# Patient Record
Sex: MALE | Employment: FULL TIME | RURAL
[De-identification: names, ages, dates, MRNs, and addresses within clinical notes are randomized per-mention and may not be internally consistent; named-entity substitution may affect disease eponyms.]

---

## 2017-02-01 ENCOUNTER — OFFICE VISIT (OUTPATIENT)
Dept: FAMILY MEDICINE CLINIC | Age: 67
End: 2017-02-01

## 2017-02-01 VITALS
DIASTOLIC BLOOD PRESSURE: 81 MMHG | TEMPERATURE: 99.1 F | OXYGEN SATURATION: 100 % | BODY MASS INDEX: 24.44 KG/M2 | HEIGHT: 69 IN | HEART RATE: 69 BPM | SYSTOLIC BLOOD PRESSURE: 134 MMHG | RESPIRATION RATE: 18 BRPM | WEIGHT: 165 LBS

## 2017-02-01 DIAGNOSIS — J40 BRONCHITIS: Primary | ICD-10-CM

## 2017-02-01 RX ORDER — AZITHROMYCIN 250 MG/1
TABLET, FILM COATED ORAL
Qty: 6 TAB | Refills: 0 | Status: SHIPPED | OUTPATIENT
Start: 2017-02-01 | End: 2017-10-13 | Stop reason: ALTCHOICE

## 2017-02-01 NOTE — PROGRESS NOTES
Subjective:  Priya Jones presents with cough congestion he has had for the last several days cough occasionally productive dark colored sputum. He denies any fever chills. Does have paroxysmal coughing episodes. Denies any shortness of breath respiratory distress no history of asthma or COPD. Had been a heavy smoker up until 1 year ago when he stopped  Appetite is fair fluid intakes good. .  Some mild dyspnea on exertion no chest pain or associated cardiac symptoms  History of positive hepatitis C antibody, reflex hepatitis virus negative, have encouraged follow-up with hepatologist he continues to decline states she has been positive since 1970s has been evaluated previously. Continue to strongly advocate he follow-up with specialist he is not interested at this time  Continues to follow-up with urology for prostate CA regularly    Problem list reviewed as part of this encounter. Past Medical History   Diagnosis Date    Cancer [facility])     Malignant neoplasm of prostate ([facility] [address]      RT      Medication list reviewed and updated. Review of Systems -as per the above otherwise negative  Objective:  Visit Vitals    /81 (BP 1 Location: Left arm, BP Patient Position: Sitting)    Pulse 69    Temp 99.1 °F (37.3 °C) (Temporal)    Resp 18    Ht 5' 8.5\" (1.74 m)    Wt 165 lb (74.8 kg)    SpO2 100%    BMI 24.72 kg/m2     Alert and oriented. No acute distress. HEENT Ears TMs are normal.  Canals are clear. Eyes pupils equal, round, react to light and accommodation. Extraocular movements intact. Nose patent. Mouth and throat is clear. Neck supple full range of motion no thyromegaly. No carotid bruits. No significant lymphadenopathy  Lungs clear to auscultation without wheezes, rales, rhonchi scattered. No respiratory distress accessory muscle use or tachypnea  Heart  RRR,  S1 & S2 are normal intensity. No murmur; no gallop  Abdomen bowel sounds active.   No tenderness, guarding, rebound, masses, organomegaly. Back no CVA tenderness. Extremities without clubbing, cyanosis, or edema  Neuro HMF intact. Assessment:  Encounter Diagnosis   Name Primary?  Bronchitis Yes           Plan:  See orders. Orders Placed This Encounter    azithromycin (ZITHROMAX) 250 mg tablet     Sig: Take 2 tablets today, then take 1 tablet daily     Dispense:  6 Tab     Refill:  0    fluids rest acetaminophen ibuprofen as needed Robitussin-DM medications as ordered  Follow up if symptoms worsen, change, fail to improve or any new symptoms develop. There are no Patient Instructions on file for this visit.     (Please note that portions of this note were completed with a voice recognition program. Efforts were made to edit the dictations but occasionally words are mis-transcribed.)

## 2017-02-01 NOTE — MR AVS SNAPSHOT
Visit Information Date & Time Provider Department Dept. Phone Encounter #  
 2/1/2017  8:45 AM Ethan Mejia [phone] [id number] Your Appointments 2/1/2017  8:45 AM  
ESTABLISHED PATIENT with MD Ethan Mejia Hillary ([address]) Appt Note: cough/cold [address] [address]  
[address] [facility] [address] [address] Upcoming Health Maintenance Date Due Pneumococcal 19-64 Highest Risk (1 of 3 - PCV13) 9/26/1971 FOBT Q 1 YEAR AGE 50-75 9/26/2002 INFLUENZA AGE 9 TO ADULT 8/1/2016 DTaP/Tdap/Td series (2 - Td) 3/9/2026 Allergies as of 2/1/2017  Review Complete On: 2/1/2017 By: Susan Hines MD  
  
 Severity Noted Reaction Type Reactions Codeine  09/10/2013    Itching Current Immunizations  Never Reviewed Name Date Tdap 3/9/2016 Not reviewed this visit You Were Diagnosed With   
  
 Codes Comments Bronchitis    -  Primary ICD-10-CM: L35 ICD-9-CM: 807 Vitals BP Pulse Temp Resp Height(growth percentile) 134/81 (BP 1 Location: Left arm, BP Patient Position: Sitting) 69 99.1 °F (37.3 °C) (Temporal) 18 5' 8.5\" (1.74 m) Weight(growth percentile) SpO2 BMI Smoking Status 165 lb (74.8 kg) 100% 24.72 kg/m2 Former Smoker BMI and BSA Data Body Mass Index Body Surface Area 24.72 kg/m 2 1.9 m 2 Preferred Pharmacy Pharmacy Name Phone [address], [address] [place] [address] Sheri Landaverde [facility] [phone] Your Updated Medication List  
  
   
This list is accurate as of: 2/1/17  8:29 AM.  Always use your most recent med list.  
  
  
  
  
 azithromycin 250 mg tablet Commonly known as:  Damian Escobar Take 2 tablets today, then take 1 tablet daily CIALIS 5 mg tablet Generic drug:  tadalafil Take 5 mg by mouth as needed. Prescriptions Sent to Pharmacy Refills  
 azithromycin (ZITHROMAX) 250 mg tablet 0 Sig: Take 2 tablets today, then take 1 tablet daily Class: Normal  
 Pharmacy: [address], [address]  #: [phone] Introducing [address]! Paddy Saba introduces [facility] patient portal. Now you can access parts of your medical record, email your doctor's office, and request medication refills online. 1. In your internet browser, go to [email or web address] 2. Click on the First Time User? Click Here link in the Sign In box. You will see the New Member Sign Up page. 3. Enter your [facility] Access Code exactly as it appears below. You will not need to use this code after youve completed the sign-up process. If you do not sign up before the expiration date, you must request a new code. · [facility] Access Code: [email or web address] Expires: 3/13/2017  7:45 AM 
 
4. Enter the last four digits of your Social Security Number (xxxx) and Date of Birth (mm/dd/yyyy) as indicated and click Submit. You will be taken to the next sign-up page. 5. Create a [facility] ID. This will be your [facility] login ID and cannot be changed, so think of one that is secure and easy to remember. 6. Create a [facility] password. You can change your password at any time. 7. Enter your Password Reset Question and Answer. This can be used at a later time if you forget your password. 8. Enter your e-mail address. You will receive e-mail notification when new information is available in [address] 9. Click Sign Up. You can now view and download portions of your medical record. 10. Click the Download Summary menu link to download a portable copy of your medical information. If you have questions, please visit the Frequently Asked Questions section of the [facility] website. Remember, [facility] is NOT to be used for urgent needs. For medical emergencies, dial 911. Now available from your iPhone and Android! Please provide this summary of care documentation to your next provider. Your primary care clinician is listed as Rosario Finnegan. If you have any questions after today's visit, please call [phone].

## 2017-10-13 ENCOUNTER — OFFICE VISIT (OUTPATIENT)
Dept: FAMILY MEDICINE CLINIC | Age: 67
End: 2017-10-13

## 2017-10-13 VITALS
OXYGEN SATURATION: 99 % | SYSTOLIC BLOOD PRESSURE: 128 MMHG | BODY MASS INDEX: 24.65 KG/M2 | WEIGHT: 166.4 LBS | DIASTOLIC BLOOD PRESSURE: 72 MMHG | HEIGHT: 69 IN | RESPIRATION RATE: 22 BRPM | HEART RATE: 65 BPM | TEMPERATURE: 98 F

## 2017-10-13 DIAGNOSIS — J06.9 UPPER RESPIRATORY TRACT INFECTION, UNSPECIFIED TYPE: Primary | ICD-10-CM

## 2017-10-13 RX ORDER — AZITHROMYCIN 250 MG/1
TABLET, FILM COATED ORAL
Qty: 6 TAB | Refills: 0 | Status: SHIPPED | OUTPATIENT
Start: 2017-10-13 | End: 2017-10-18

## 2017-10-13 NOTE — MR AVS SNAPSHOT
Visit Information Date & Time Provider Department Dept. Phone Encounter #  
 10/13/2017 11:00 AM Juwan Gutierrez, [address] [phone] [id number] Upcoming Health Maintenance Date Due FOBT Q 1 YEAR AGE 50-75 9/26/2002 GLAUCOMA SCREENING Q2Y 9/26/2017 Pneumococcal 65+ High/Highest Risk (2 of 2 - PPSV23) 12/8/2017 MEDICARE YEARLY EXAM 10/14/2018 DTaP/Tdap/Td series (2 - Td) 3/9/2026 Allergies as of 10/13/2017  Review Complete On: 10/13/2017 By: LUCY Card Severity Noted Reaction Type Reactions Codeine  09/10/2013    Itching Current Immunizations  Never Reviewed Name Date Tdap 3/9/2016 Not reviewed this visit You Were Diagnosed With   
  
 Codes Comments Upper respiratory tract infection, unspecified type    -  Primary ICD-10-CM: J06.9 ICD-9-CM: 465.9 Vitals BP Pulse Temp Resp Height(growth percentile) Weight(growth percentile) 128/72 (BP 1 Location: Left arm, BP Patient Position: Sitting) 65 98 °F (36.7 °C) (Temporal) 22 5' 8.5\" (1.74 m) 166 lb 6.4 oz (75.5 kg) SpO2 BMI Smoking Status 99% 24.93 kg/m2 Former Smoker Vitals History BMI and BSA Data Body Mass Index Body Surface Area 24.93 kg/m 2 1.91 m 2 Preferred Pharmacy Pharmacy Name Phone [address], [facility] [place] [phone] Your Updated Medication List  
  
   
This list is accurate as of: 10/13/17 11:30 AM.  Always use your most recent med list.  
  
  
  
  
 azithromycin 250 mg tablet Commonly known as:  Louie Reyes Take 2 tablets today, then take 1 tablet daily CIALIS 5 mg tablet Generic drug:  tadalafil Take 5 mg by mouth as needed. Prescriptions Sent to Pharmacy Refills  
 azithromycin (ZITHROMAX) 250 mg tablet 0 Sig: Take 2 tablets today, then take 1 tablet daily  Class: Normal  
 Pharmacy: [address] [facility], [address]  #: [phone] Introducing [facility]! [employer] introduces [employer] patient portal. Now you can access parts of your medical record, email your doctor's office, and request medication refills online. 1. In your internet browser, go to [email or web address] 2. Click on the First Time User? Click Here link in the Sign In box. You will see the New Member Sign Up page. 3. Enter your [employer] Access Code exactly as it appears below. You will not need to use this code after youve completed the sign-up process. If you do not sign up before the expiration date, you must request a new code. · [employer] Access Code: [email or web address] Expires: 1/11/2018 11:30 AM 
 
4. Enter the last four digits of your Social Security Number (xxxx) and Date of Birth (mm/dd/yyyy) as indicated and click Submit. You will be taken to the next sign-up page. 5. Create a [employer] ID. This will be your [employer] login ID and cannot be changed, so think of one that is secure and easy to remember. 6. Create a [employer] password. You can change your password at any time. 7. Enter your Password Reset Question and Answer. This can be used at a later time if you forget your password. 8. Enter your e-mail address. You will receive e-mail notification when new information is available in [address] 9. Click Sign Up. You can now view and download portions of your medical record. 10. Click the Download Summary menu link to download a portable copy of your medical information. If you have questions, please visit the Frequently Asked Questions section of the [employer] website. Remember, [employer] is NOT to be used for urgent needs. For medical emergencies, dial 911. Now available from your iPhone and Android! Please provide this summary of care documentation to your next provider. Your primary care clinician is listed as Chapito Martinez. If you have any questions after today's visit, please call [phone].

## 2017-10-13 NOTE — PROGRESS NOTES
[address] [address] [facility] [place]   [phone]     10/13/2017  Chief Complaint   Patient presents with    Cold Symptoms     cough       HPI   is a 74 y.o. male presents today for acute care visit. Head congestion, drainage with cough over the past week. States he gets it every year at this time       Review of Systems   Constitutional: Negative for chills and fever. HENT: Positive for congestion and sore throat. Respiratory: Positive for cough. Cardiovascular: Negative. Past Medical History:   Diagnosis Date    Cancer [facility])     Malignant neoplasm of prostate (HCC)     RT       Allergies   Allergen Reactions    Codeine Itching       Current Outpatient Prescriptions   Medication Sig    azithromycin (ZITHROMAX) 250 mg tablet Take 2 tablets today, then take 1 tablet daily    tadalafil (CIALIS) 5 mg tablet Take 5 mg by mouth as needed. No current facility-administered medications for this visit. Visit Vitals    /72 (BP 1 Location: Left arm, BP Patient Position: Sitting)    Pulse 65    Temp 98 °F (36.7 °C) (Temporal)    Resp 22    Ht 5' 8.5\" (1.74 m)    Wt 166 lb 6.4 oz (75.5 kg)    SpO2 99%    BMI 24.93 kg/m2     Physical Exam   HENT:   Nose: Sinus tenderness present. Neck: Neck supple. Cardiovascular: Normal heart sounds. Pulmonary/Chest: Effort normal and breath sounds normal.   Lymphadenopathy:     He has no cervical adenopathy. Vitals reviewed. ICD-10-CM ICD-9-CM    1. Upper respiratory tract infection, unspecified type J06.9 465.9 azithromycin (ZITHROMAX) 250 mg tablet             Follow-up Disposition:  Return if symptoms worsen or fail to improve.       Micaela López PA-C, P

## 2018-06-25 ENCOUNTER — OFFICE VISIT (OUTPATIENT)
Dept: FAMILY MEDICINE CLINIC | Age: 68
End: 2018-06-25

## 2018-06-25 VITALS
RESPIRATION RATE: 24 BRPM | DIASTOLIC BLOOD PRESSURE: 70 MMHG | OXYGEN SATURATION: 99 % | HEIGHT: 68 IN | HEART RATE: 76 BPM | TEMPERATURE: 97.3 F | BODY MASS INDEX: 24.31 KG/M2 | SYSTOLIC BLOOD PRESSURE: 136 MMHG | WEIGHT: 160.4 LBS

## 2018-06-25 DIAGNOSIS — B19.20 HEPATITIS C VIRUS INFECTION WITHOUT HEPATIC COMA, UNSPECIFIED CHRONICITY: ICD-10-CM

## 2018-06-25 DIAGNOSIS — F17.290 PIPE SMOKER: ICD-10-CM

## 2018-06-25 DIAGNOSIS — Z00.00 WELL ADULT EXAM: Primary | ICD-10-CM

## 2018-06-25 DIAGNOSIS — Z12.11 COLON CANCER SCREENING: ICD-10-CM

## 2018-06-25 DIAGNOSIS — Z11.59 ENCOUNTER FOR HEPATITIS C SCREENING TEST FOR LOW RISK PATIENT: ICD-10-CM

## 2018-06-25 DIAGNOSIS — C61 PROSTATE CANCER (HCC): ICD-10-CM

## 2018-06-25 NOTE — ACP (ADVANCE CARE PLANNING)
Discussed importance of advanced medical directives with patient. Patient is capable of making decisions.   [facility] NP-C

## 2018-06-25 NOTE — PATIENT INSTRUCTIONS
Body Mass Index: Care Instructions  Your Care Instructions    Body mass index (BMI) can help you see if your weight is raising your risk for health problems. It uses a formula to compare how much you weigh with how tall you are. · A BMI lower than 18.5 is considered underweight. · A BMI between 18.5 and 24.9 is considered healthy. · A BMI between 25 and 29.9 is considered overweight. A BMI of 30 or higher is considered obese. If your BMI is in the normal range, it means that you have a lower risk for weight-related health problems. If your BMI is in the overweight or obese range, you may be at increased risk for weight-related health problems, such as high blood pressure, heart disease, stroke, arthritis or joint pain, and diabetes. If your BMI is in the underweight range, you may be at increased risk for health problems such as fatigue, lower protection (immunity) against illness, muscle loss, bone loss, hair loss, and hormone problems. BMI is just one measure of your risk for weight-related health problems. You may be at higher risk for health problems if you are not active, you eat an unhealthy diet, or you drink too much alcohol or use tobacco products. Follow-up care is a key part of your treatment and safety. Be sure to make and go to all appointments, and call your doctor if you are having problems. It's also a good idea to know your test results and keep a list of the medicines you take. How can you care for yourself at home? · Practice healthy eating habits. This includes eating plenty of fruits, vegetables, whole grains, lean protein, and low-fat dairy. · If your doctor recommends it, get more exercise. Walking is a good choice. Bit by bit, increase the amount you walk every day. Try for at least 30 minutes on most days of the week. · Do not smoke. Smoking can increase your risk for health problems. If you need help quitting, talk to your doctor about stop-smoking programs and medicines. These can increase your chances of quitting for good. · Limit alcohol to 2 drinks a day for men and 1 drink a day for women. Too much alcohol can cause health problems. If you have a BMI higher than 25  · Your doctor may do other tests to check your risk for weight-related health problems. This may include measuring the distance around your waist. A waist measurement of more than 40 inches in men or 35 inches in women can increase the risk of weight-related health problems. · Talk with your doctor about steps you can take to stay healthy or improve your health. You may need to make lifestyle changes to lose weight and stay healthy, such as changing your diet and getting regular exercise. If you have a BMI lower than 18.5  · Your doctor may do other tests to check your risk for health problems. · Talk with your doctor about steps you can take to stay healthy or improve your health. You may need to make lifestyle changes to gain or maintain weight and stay healthy, such as getting more healthy foods in your diet and doing exercises to build muscle. Where can you learn more? Go to http://tacho-roma.info/. Enter S176 in the search box to learn more about \"Body Mass Index: Care Instructions. \"  Current as of: October 13, 2016  Content Version: 11.4  © 9970-0125 Healthwise, [employer]. Care instructions adapted under license by [employer] (which disclaims liability or warranty for this information). If you have questions about a medical condition or this instruction, always ask your healthcare professional. [address] [address] any warranty or liability for your use of this information. Heart-Healthy Diet: Care Instructions  Your Care Instructions    A heart-healthy diet has lots of vegetables, fruits, nuts, beans, and whole grains, and is low in salt. It limits foods that are high in saturated fat, such as meats, cheeses, and fried foods.  It may be hard to change your diet, but even small changes can lower your risk of heart attack and heart disease. Follow-up care is a key part of your treatment and safety. Be sure to make and go to all appointments, and call your doctor if you are having problems. It's also a good idea to know your test results and keep a list of the medicines you take. How can you care for yourself at home? Watch your portions  · Learn what a serving is. A \"serving\" and a \"portion\" are not always the same thing. Make sure that you are not eating larger portions than are recommended. For example, a serving of pasta is ½ cup. A serving size of meat is 2 to 3 ounces. A 3-ounce serving is about the size of a deck of cards. Measure serving sizes until you are good at [place]" them. Keep in mind that restaurants often serve portions that are 2 or 3 times the size of one serving. · To keep your energy level up and keep you from feeling hungry, eat often but in smaller portions. · Eat only the number of calories you need to stay at a healthy weight. If you need to lose weight, eat fewer calories than your body burns (through exercise and other physical activity). Eat more fruits and vegetables  · Eat a variety of fruit and vegetables every day. Dark green, deep orange, red, or yellow fruits and vegetables are especially good for you. Examples include spinach, carrots, peaches, and berries. · Keep carrots, celery, and other veggies handy for snacks. Buy fruit that is in season and store it where you can see it so that you will be tempted to eat it. · Cook dishes that have a lot of veggies in them, such as stir-fries and soups. Limit saturated and trans fat  · Read food labels, and try to avoid saturated and trans fats. They increase your risk of heart disease. Trans fat is found in many processed foods such as cookies and crackers. · Use olive or canola oil when you cook.  Try cholesterol-lowering spreads, such as Benecol or Take Control. · Bake, broil, grill, or steam foods instead of frying them. · Choose lean meats instead of high-fat meats such as hot dogs and sausages. Cut off all visible fat when you prepare meat. · Eat fish, skinless poultry, and meat alternatives such as soy products instead of high-fat meats. Soy products, such as tofu, may be especially good for your heart. · Choose low-fat or fat-free milk and dairy products. Eat fish  · Eat at least two servings of fish a week. Certain fish, such as salmon and tuna, contain omega-3 fatty acids, which may help reduce your risk of heart attack. Eat foods high in fiber  · Eat a variety of grain products every day. Include whole-grain foods that have lots of fiber and nutrients. Examples of whole-grain foods include oats, whole wheat bread, and brown rice. · Buy whole-grain breads and cereals, instead of white bread or pastries. Limit salt and sodium  · Limit how much salt and sodium you eat to help lower your blood pressure. · Taste food before you salt it. Add only a little salt when you think you need it. With time, your taste buds will adjust to less salt. · Eat fewer snack items, fast foods, and other high-salt, processed foods. Check food labels for the amount of sodium in packaged foods. · Choose low-sodium versions of canned goods (such as soups, vegetables, and beans). Limit sugar  · Limit drinks and foods with added sugar. These include candy, desserts, and soda pop. Limit alcohol  · Limit alcohol to no more than 2 drinks a day for men and 1 drink a day for women. Too much alcohol can cause health problems. When should you call for help? Watch closely for changes in your health, and be sure to contact your doctor if:  ? · You would like help planning heart-healthy meals. Where can you learn more? Go to http://tacho-roma.info/. Enter V137 in the search box to learn more about \"Heart-Healthy Diet: Care Instructions. \"  Current as of: September 21, 2016  Content Version: 11.4  © 3416-5606 [employer]. Care instructions adapted under license by [employer] (which disclaims liability or warranty for this information). If you have questions about a medical condition or this instruction, always ask your healthcare professional. [address] any warranty or liability for your use of this information. Learning About Benefits From Quitting Smoking  How does quitting smoking make you healthier? If you're thinking about quitting smoking, you may have a few reasons to be smoke-free. Your health may be one of them. · When you quit smoking, you lower your risks for cancer, lung disease, heart attack, stroke, blood vessel disease, and blindness from macular degeneration. · When you're smoke-free, you get sick less often, and you heal faster. You are less likely to get colds, flu, bronchitis, and pneumonia. · As a nonsmoker, you may find that your mood is better and you are less stressed. When and how will you feel healthier? Quitting has real health benefits that start from day 1 of being smoke-free. And the longer you stay smoke-free, the healthier you get and the better you feel. The first hours  · After just 20 minutes, your blood pressure and heart rate go down. That means there's less stress on your heart and blood vessels. · Within 12 hours, the level of carbon monoxide in your blood drops back to normal. That makes room for more oxygen. With more oxygen in your body, you may notice that you have more energy than when you smoked. After 2 weeks  · Your lungs start to work better. · Your risk of heart attack starts to drop. After 1 month  · When your lungs are clear, you cough less and breathe deeper, so it's easier to be active. · Your sense of taste and smell return. That means you can enjoy food more than you have since you started smoking.   Over the years  · After 1 year, your risk of heart disease is half what it would be if you kept smoking. · After 5 years, your risk of stroke starts to shrink. Within a few years after that, it's about the same as if you'd never smoked. · After 10 years, your risk of dying from lung cancer is cut by about half. And your risk for many other types of cancer is lower too. How would quitting help others in your life? When you quit smoking, you improve the health of everyone who now breathes in your smoke. · Their heart, lung, and cancer risks drop, much like yours. · They are sick less. For babies and small children, living smoke-free means they're less likely to have ear infections, pneumonia, and bronchitis. · If you're a woman who is or will be pregnant someday, quitting smoking means a healthier . · Children who are close to you are less likely to become adult smokers. Where can you learn more? Go to http://tacho-roma.info/. Enter [id number] [id number] [id number] 11 in the search box to learn more about \"Learning About Benefits From Quitting Smoking. \"  Current as of: 2017  Content Version: 11.4  © 2077-2290 Healthwise, Incorporated. Care instructions adapted under license by [employer] (which disclaims liability or warranty for this information). If you have questions about a medical condition or this instruction, always ask your healthcare professional. [address] any warranty or liability for your use of this information.

## 2018-06-25 NOTE — PROGRESS NOTES
Subjective:   HPI:   Eder Araujo works for [employer] where he is on his feet all day. His wife is a state employee. She works at [employer]. Pipe Smoker: He states he has quit smoking a pipe and has switched to vapes. He claims he can breath better and take deeper breaths since switching. He also states that he has had no nose bleeds since switching, where he would have frequent nose bleeds prior. He thinks it is from quitting the pipe. Prostate Cancer: He is requesting to have his PSA checked today. He denies any change in his bladder. He sees Dr. Hien Silver yearly for his prostate. Hepatitis C: He wants a lab drawn for Hep C today. He states he continually goes back and forth with the [facility] to approve Hep C testing. Wanda De Leon is a 74 y.o. male who presents today with the following:  Chief Complaint   Patient presents with    Physical       Patient Active Problem List   Diagnosis Code    Prostate cancer ([facility] C61    Hepatitis C B19.20    Pipe smoker F17.290         COMPLIANT WITH MEDICATION:    Denies chest pain, dyspnea, palpitations, headache and blurred vision. Blood pressure normotensive. BMI:reviewed BMI with him. Prostate cancer: follwed by Dr. Marguerite Aiken smoker: quit now does vaping. Feels better not interested quiting. ROS:  Gen: denies fever, chills, fatigue, weight loss, weight gain  HEENT:denies blurry vision, nasal congestion, sore throat  Resp: denies dypsnea, cough, wheezing  CV: denies chest pain radiating to the jaws or arms, palpitations, lower extremity edema  Abd: denies nausea, vomiting, diarrhea, constipation  Neuro: denies numbness/tingling  Endo: denies polyuria, polydipsia, heat/cold intolerance  Heme: no lymphadenopathy    Allergies   Allergen Reactions    Codeine Itching         Current Outpatient Prescriptions:     tadalafil (CIALIS) 5 mg tablet, Take 5 mg by mouth as needed. , Disp: , Rfl:     Past Medical History:   Diagnosis Date    Cancer ([facility]     Malignant neoplasm of prostate ([facility]     RT       Past Surgical History:   Procedure Laterality Date    HX COLONOSCOPY  2010    HX HERNIA REPAIR  2016    rt inquinal    HX UROLOGICAL      prostate bx       History   Smoking Status    Former Smoker    Quit date: 2/1/2016   Smokeless Tobacco    Never Used       Social History     Social History    Marital status:      Spouse name: N/A    Number of children: N/A    Years of education: N/A     Social History Main Topics    Smoking status: Former Smoker     Quit date: 2/1/2016    Smokeless tobacco: Never Used    Alcohol use Yes      Comment: daily    Drug use: No    Sexual activity: Not Asked     Other Topics Concern    None     Social History Narrative       Family History   Problem Relation Age of Onset    Alzheimer Mother     Cancer Father     Heart Disease Father     Hypertension Father          Objective:     Visit Vitals    /70 (BP 1 Location: Left arm, BP Patient Position: Sitting)    Pulse 76    Temp 97.3 °F (36.3 °C) (Temporal)    Resp 24    Ht 5' 8\" (1.727 m)    Wt 160 lb 6.4 oz (72.8 kg)    SpO2 99%    BMI 24.39 kg/m2     Body mass index is 24.39 kg/(m^2). General: Alert and oriented. No acute distress. Well nourished  HEENT :  Ears:TMs are normal. Canals are clear. Eyes: pupils equal, round, react to light and accommodation. Extra ocular movements intact. Nose: patent. Mouth and throat is clear. Neck:supple full range of motion no thyromegaly. Trachea midline, No carotid bruits. No significant lymphadenopathy  Lungs[de-identified] clear to auscultation without wheezes, rales, or rhonchi. Heart :RRR, S1 & S2 are normal intensity. No murmur; no gallop  Abdomen: bowel sounds active. No tenderness, guarding, rebound, masses, hepatic or spleen enlargement  Back: no CVA tenderness. Extremities: without clubbing, cyanosis, or edema  Pulses: radial and femoral pulses are normal  Neuro: HMF intact.  Cranial nerves II through XII grossly normal.  Motor: is 5 over 5 and symmetrical.   Deep tendon reflexes: +2 equal    Results for orders placed or performed in visit on 12/13/16   PSA DIAGNOSTIC (PROSTATIC SPECIFIC AG)   Result Value Ref Range    Prostate Specific Ag 0.4 0.0 - 4.0 ng/mL       No results found for this visit on 06/25/18. Assessment/ Plan:     1. Colon cancer screening  Had a colonoscopy 8 years ago and is on a 10 year follow-up. Will have another screening colonoscopy in 2020.  - OCCULT BLOOD, IMMUNOASSAY (FIT); Future    2. BMI 24.0-24.9, adult  Discussed the patient's BMI with him. The BMI follow up plan is as follows: He says he is on his feet all day walking for work and that is his exercise. dietary management education, guidance, and counseling  encourage exercise  monitor weight  prescribed dietary intake    An After Visit Summary was printed and given to the patient. - OCCULT BLOOD, IMMUNOASSAY (FIT); Future  - CBC WITH AUTOMATED DIFF  - METABOLIC PANEL, COMPREHENSIVE  - LIPID PANEL  - COLLECTION VENOUS BLOOD,VENIPUNCTURE    3. Prostate cancer ([facility]  Followed by Dr. Jenifer Yeager yearly  PSA 0.4 last check per patient  4. Well adult exam    - OCCULT BLOOD, IMMUNOASSAY (FIT); Future  - CBC WITH AUTOMATED DIFF  - METABOLIC PANEL, COMPREHENSIVE  - LIPID PANEL  - COLLECTION VENOUS BLOOD,VENIPUNCTURE    5. Hepatitis C virus infection without hepatic coma, unspecified chronicity    -Hep C screening    6. Pipe smoker  Smoking cessation counseling provided. He has quit smoking a pipe and vapes. Orders Placed This Encounter    COLLECTION VENOUS BLOOD,VENIPUNCTURE    OCCULT BLOOD, IMMUNOASSAY (FIT)     Standing Status:   Future     Standing Expiration Date:   12/25/2018    CBC WITH AUTOMATED DIFF    METABOLIC PANEL, COMPREHENSIVE    LIPID PANEL         Verbal and written instructions (see AVS) provided.  Patient expresses understanding of diagnosis and treatment plan.     Health Maintenance Due   Topic Date Due    FOBT Q 1 YEAR AGE 50-75  09/26/2002    GLAUCOMA SCREENING Q2Y  09/26/2017    Pneumococcal 65+ High/Highest Risk (2 of 2 - PPSV23) 12/08/2017         Follow-up Disposition:  Return in about 1 year (around 6/25/2019). Or sooner as needed.       Adolph Moise, FNP-C

## 2018-06-25 NOTE — MR AVS SNAPSHOT
[address] [place] [address] [facility]  
[phone] Patient: Aniya Schroeder 
MRN: [id number]:[id number] Visit Information Date & Time Provider Department Dept. Phone Encounter #  
 6/25/2018  7:30 AM Leatha Muñiz, BIA [facility]dard [address] [facility] [phone] [id number] Upcoming Health Maintenance Date Due FOBT Q 1 YEAR AGE 50-75 9/26/2002 GLAUCOMA SCREENING Q2Y 9/26/2017 Pneumococcal 65+ High/Highest Risk (2 of 2 - PPSV23) 12/8/2017 Influenza Age 5 to Adult 8/1/2018 DTaP/Tdap/Td series (2 - Td) 3/9/2026 Allergies as of 6/25/2018  Review Complete On: 6/25/2018 By: Leatha Muñiz, BIA Severity Noted Reaction Type Reactions Codeine  09/10/2013    Itching Current Immunizations  Never Reviewed Name Date Tdap 3/9/2016 Not reviewed this visit You Were Diagnosed With   
  
 Codes Comments Well adult exam    -  Primary ICD-10-CM: Z00.00 ICD-9-CM: V70.0 Colon cancer screening     ICD-10-CM: Z12.11 ICD-9-CM: V76.51   
 BMI 24.0-24.9, adult     ICD-10-CM: Z68.24 ICD-9-CM: V85.1 Prostate cancer [facility])     ICD-10-CM: V09 ICD-9-CM: 766 Hepatitis C virus infection without hepatic coma, unspecified chronicity     ICD-10-CM: B19.20 ICD-9-CM: 070.70 Pipe smoker     ICD-10-CM: F17.290 ICD-9-CM: 305.1 Encounter for hepatitis C screening test for low risk patient     ICD-10-CM: Z11.59 
ICD-9-CM: V73.89 Vitals BP Pulse Temp Resp Height(growth percentile) 136/70 (BP 1 Location: Left arm, BP Patient Position: Sitting) 76 97.3 °F (36.3 °C) (Temporal) 24 5' 8\" (1.727 m) Weight(growth percentile) SpO2 BMI Smoking Status 160 lb 6.4 oz (72.8 kg) 99% 24.39 kg/m2 Former Smoker BMI and BSA Data Body Mass Index Body Surface Area  
 24.39 kg/m 2 1.87 m 2 Preferred Pharmacy Pharmacy Name Phone [address] [facility], [address] [phone] Your Updated Medication List  
  
   
This list is accurate as of 6/25/18  8:26 AM.  Always use your most recent med list.  
  
  
  
  
 CIALIS 5 mg tablet Generic drug:  tadalafil Take 5 mg by mouth as needed. We Performed the Following CBC WITH AUTOMATED DIFF [31553 CPT(R)] COLLECTION VENOUS BLOOD,VENIPUNCTURE [id number] CPT(R)] HEPATITIS C AB [12881 CPT(R)] LIPID PANEL [19314 CPT(R)] METABOLIC PANEL, COMPREHENSIVE [41938 CPT(R)] PSA, DIAGNOSTIC (PROSTATE SPECIFIC AG) [id number] CPT(R)] To-Do List   
 07/25/2018 Lab:  OCCULT BLOOD, IMMUNOASSAY (FIT) Patient Instructions Body Mass Index: Care Instructions Your Care Instructions Body mass index (BMI) can help you see if your weight is raising your risk for health problems. It uses a formula to compare how much you weigh with how tall you are. · A BMI lower than 18.5 is considered underweight. · A BMI between 18.5 and 24.9 is considered healthy. · A BMI between 25 and 29.9 is considered overweight. A BMI of 30 or higher is considered obese. If your BMI is in the normal range, it means that you have a lower risk for weight-related health problems. If your BMI is in the overweight or obese range, you may be at increased risk for weight-related health problems, such as high blood pressure, heart disease, stroke, arthritis or joint pain, and diabetes. If your BMI is in the underweight range, you may be at increased risk for health problems such as fatigue, lower protection (immunity) against illness, muscle loss, bone loss, hair loss, and hormone problems. BMI is just one measure of your risk for weight-related health problems. You may be at higher risk for health problems if you are not active, you eat an unhealthy diet, or you drink too much alcohol or use tobacco products. Follow-up care is a key part of your treatment and safety. Be sure to make and go to all appointments, and call your doctor if you are having problems. It's also a good idea to know your test results and keep a list of the medicines you take. How can you care for yourself at home? · Practice healthy eating habits. This includes eating plenty of fruits, vegetables, whole grains, lean protein, and low-fat dairy. · If your doctor recommends it, get more exercise. Walking is a good choice. Bit by bit, increase the amount you walk every day. Try for at least 30 minutes on most days of the week. · Do not smoke. Smoking can increase your risk for health problems. If you need help quitting, talk to your doctor about stop-smoking programs and medicines. These can increase your chances of quitting for good. · Limit alcohol to 2 drinks a day for men and 1 drink a day for women. Too much alcohol can cause health problems. If you have a BMI higher than 25 · Your doctor may do other tests to check your risk for weight-related health problems. This may include measuring the distance around your waist. A waist measurement of more than 40 inches in men or 35 inches in women can increase the risk of weight-related health problems. · Talk with your doctor about steps you can take to stay healthy or improve your health. You may need to make lifestyle changes to lose weight and stay healthy, such as changing your diet and getting regular exercise. If you have a BMI lower than 18.5 · Your doctor may do other tests to check your risk for health problems. · Talk with your doctor about steps you can take to stay healthy or improve your health. You may need to make lifestyle changes to gain or maintain weight and stay healthy, such as getting more healthy foods in your diet and doing exercises to build muscle. Where can you learn more? Go to http://tacho-roma.info/. Enter S176 in the search box to learn more about \"Body Mass Index: Care Instructions. \" Current as of: October 13, 2016 Content Version: 11.4 © 0487-7184 [employer]. Care instructions adapted under license by [employer] (which disclaims liability or warranty for this information). If you have questions about a medical condition or this instruction, always ask your healthcare professional. [address] any warranty or liability for your use of this information. Heart-Healthy Diet: Care Instructions Your Care Instructions A heart-healthy diet has lots of vegetables, fruits, nuts, beans, and whole grains, and is low in salt. It limits foods that are high in saturated fat, such as meats, cheeses, and fried foods. It may be hard to change your diet, but even small changes can lower your risk of heart attack and heart disease. Follow-up care is a key part of your treatment and safety. Be sure to make and go to all appointments, and call your doctor if you are having problems. It's also a good idea to know your test results and keep a list of the medicines you take. How can you care for yourself at home? Watch your portions · Learn what a serving is. A \"serving\" and a \"portion\" are not always the same thing. Make sure that you are not eating larger portions than are recommended. For example, a serving of pasta is ½ cup. A serving size of meat is 2 to 3 ounces. A 3-ounce serving is about the size of a deck of cards. Measure serving sizes until you are good at [place]" them. Keep in mind that restaurants often serve portions that are 2 or 3 times the size of one serving. · To keep your energy level up and keep you from feeling hungry, eat often but in smaller portions. · Eat only the number of calories you need to stay at a healthy weight. If you need to lose weight, eat fewer calories than your body burns (through exercise and other physical activity). Eat more fruits and vegetables · Eat a variety of fruit and vegetables every day. Dark green, deep orange, red, or yellow fruits and vegetables are especially good for you. Examples include spinach, carrots, peaches, and berries. · Keep carrots, celery, and other veggies handy for snacks. Buy fruit that is in season and store it where you can see it so that you will be tempted to eat it. · Cook dishes that have a lot of veggies in them, such as stir-fries and soups. Limit saturated and trans fat · Read food labels, and try to avoid saturated and trans fats. They increase your risk of heart disease. Trans fat is found in many processed foods such as cookies and crackers. · Use olive or canola oil when you cook. Try cholesterol-lowering spreads, such as Benecol or Take Control. · Bake, broil, grill, or steam foods instead of frying them. · Choose lean meats instead of high-fat meats such as hot dogs and sausages. Cut off all visible fat when you prepare meat. · Eat fish, skinless poultry, and meat alternatives such as soy products instead of high-fat meats. Soy products, such as tofu, may be especially good for your heart. · Choose low-fat or fat-free milk and dairy products. Eat fish · Eat at least two servings of fish a week. Certain fish, such as salmon and tuna, contain omega-3 fatty acids, which may help reduce your risk of heart attack. Eat foods high in fiber · Eat a variety of grain products every day. Include whole-grain foods that have lots of fiber and nutrients. Examples of whole-grain foods include oats, whole wheat bread, and brown rice. · Buy whole-grain breads and cereals, instead of white bread or pastries. Limit salt and sodium · Limit how much salt and sodium you eat to help lower your blood pressure. · Taste food before you salt it. Add only a little salt when you think you need it. With time, your taste buds will adjust to less salt. · Eat fewer snack items, fast foods, and other high-salt, processed foods. Check food labels for the amount of sodium in packaged foods. · Choose low-sodium versions of canned goods (such as soups, vegetables, and beans). Limit sugar · Limit drinks and foods with added sugar. These include candy, desserts, and soda pop. Limit alcohol · Limit alcohol to no more than 2 drinks a day for men and 1 drink a day for women. Too much alcohol can cause health problems. When should you call for help? Watch closely for changes in your health, and be sure to contact your doctor if: 
? · You would like help planning heart-healthy meals. Where can you learn more? Go to http://tacho-roma.info/. Enter V137 in the search box to learn more about \"Heart-Healthy Diet: Care Instructions. \" Current as of: September 21, 2016 Content Version: 11.4 © 6824-1979 [employer]. Care instructions adapted under license by [employer] (which disclaims liability or warranty for this information). If you have questions about a medical condition or this instruction, always ask your healthcare professional. [place] [address] any warranty or liability for your use of this information. Learning About Benefits From Quitting Smoking How does quitting smoking make you healthier? If you're thinking about quitting smoking, you may have a few reasons to be smoke-free. Your health may be one of them. · When you quit smoking, you lower your risks for cancer, lung disease, heart attack, stroke, blood vessel disease, and blindness from macular degeneration. · When you're smoke-free, you get sick less often, and you heal faster. You are less likely to get colds, flu, bronchitis, and pneumonia. · As a nonsmoker, you may find that your mood is better and you are less stressed. When and how will you feel healthier?  
Quitting has real health benefits that start from day 1 of being smoke-free. And the longer you stay smoke-free, the healthier you get and the better you feel. The first hours · After just 20 minutes, your blood pressure and heart rate go down. That means there's less stress on your heart and blood vessels. · Within 12 hours, the level of carbon monoxide in your blood drops back to normal. That makes room for more oxygen. With more oxygen in your body, you may notice that you have more energy than when you smoked. After 2 weeks · Your lungs start to work better. · Your risk of heart attack starts to drop. After 1 month · When your lungs are clear, you cough less and breathe deeper, so it's easier to be active. · Your sense of taste and smell return. That means you can enjoy food more than you have since you started smoking. Over the years · After 1 year, your risk of heart disease is half what it would be if you kept smoking. · After 5 years, your risk of stroke starts to shrink. Within a few years after that, it's about the same as if you'd never smoked. · After 10 years, your risk of dying from lung cancer is cut by about half. And your risk for many other types of cancer is lower too. How would quitting help others in your life? When you quit smoking, you improve the health of everyone who now breathes in your smoke. · Their heart, lung, and cancer risks drop, much like yours. · They are sick less. For babies and small children, living smoke-free means they're less likely to have ear infections, pneumonia, and bronchitis. · If you're a woman who is or will be pregnant someday, quitting smoking means a healthier . · Children who are close to you are less likely to become adult smokers. Where can you learn more? Go to http://tacho-roma.info/. Enter [id number] [id number] [id number] 11 in the search box to learn more about \"Learning About Benefits From Quitting Smoking. \" Current as of: 2017 Content Version: 11.4 © 6916-8104 Healthwise, Incorporated. Care instructions adapted under license by [employer] (which disclaims liability or warranty for this information). If you have questions about a medical condition or this instruction, always ask your healthcare professional. [address] any warranty or liability for your use of this information. Introducing [facility]! [facility] introduces Anatexis patient portal. Now you can access parts of your medical record, email your doctor's office, and request medication refills online. 1. In your internet browser, go to [email or web address] 2. Click on the First Time User? Click Here link in the Sign In box. You will see the New Member Sign Up page. 3. Enter your Anatexis Access Code exactly as it appears below. You will not need to use this code after youve completed the sign-up process. If you do not sign up before the expiration date, you must request a new code. · Anatexis Access Code: [email or web address] Expires: 9/23/2018  8:26 AM 
 
4. Enter the last four digits of your Social Security Number (xxxx) and Date of Birth (mm/dd/yyyy) as indicated and click Submit. You will be taken to the next sign-up page. 5. Create a Anatexis ID. This will be your Anatexis login ID and cannot be changed, so think of one that is secure and easy to remember. 6. Create a Anatexis password. You can change your password at any time. 7. Enter your Password Reset Question and Answer. This can be used at a later time if you forget your password. 8. Enter your e-mail address. You will receive e-mail notification when new information is available in [address] 9. Click Sign Up. You can now view and download portions of your medical record. 10. Click the Download Summary menu link to download a portable copy of your medical information.  
 
If you have questions, please visit the Frequently Asked Questions section of the THE [employer]TY [employer]. Remember, Keepyhart is NOT to be used for urgent needs. For medical emergencies, dial 911. Now available from your iPhone and Android! Please provide this summary of care documentation to your next provider. Your primary care clinician is listed as Rennis Marker. If you have any questions after today's visit, please call [phone].

## 2018-06-26 LAB
ALBUMIN SERPL-MCNC: 4.7 G/DL (ref 3.6–4.8)
ALBUMIN/GLOB SERPL: 2.5 {RATIO} (ref 1.2–2.2)
ALP SERPL-CCNC: 48 IU/L (ref 39–117)
ALT SERPL-CCNC: 20 IU/L (ref 0–44)
AST SERPL-CCNC: 18 IU/L (ref 0–40)
BASOPHILS # BLD AUTO: 0 X10E3/UL (ref 0–0.2)
BASOPHILS NFR BLD AUTO: 0 %
BILIRUB SERPL-MCNC: 0.7 MG/DL (ref 0–1.2)
BUN SERPL-MCNC: 11 MG/DL (ref 8–27)
BUN/CREAT SERPL: 13 (ref 10–24)
CALCIUM SERPL-MCNC: 9.4 MG/DL (ref 8.6–10.2)
CHLORIDE SERPL-SCNC: 105 MMOL/L (ref 96–106)
CHOLEST SERPL-MCNC: 167 MG/DL (ref 100–199)
CO2 SERPL-SCNC: 22 MMOL/L (ref 20–29)
CREAT SERPL-MCNC: 0.82 MG/DL (ref 0.76–1.27)
EOSINOPHIL # BLD AUTO: 0.1 X10E3/UL (ref 0–0.4)
EOSINOPHIL NFR BLD AUTO: 1 %
ERYTHROCYTE [DISTWIDTH] IN BLOOD BY AUTOMATED COUNT: 13 % (ref 12.3–15.4)
GLOBULIN SER CALC-MCNC: 1.9 G/DL (ref 1.5–4.5)
GLUCOSE SERPL-MCNC: 97 MG/DL (ref 65–99)
HCT VFR BLD AUTO: 44.2 % (ref 37.5–51)
HCV AB S/CO SERPL IA: 6 S/CO RATIO (ref 0–0.9)
HDLC SERPL-MCNC: 103 MG/DL
HGB BLD-MCNC: 14.7 G/DL (ref 13–17.7)
IMM GRANULOCYTES # BLD: 0 X10E3/UL (ref 0–0.1)
IMM GRANULOCYTES NFR BLD: 0 %
LDLC SERPL CALC-MCNC: 56 MG/DL (ref 0–99)
LYMPHOCYTES # BLD AUTO: 2 X10E3/UL (ref 0.7–3.1)
LYMPHOCYTES NFR BLD AUTO: 30 %
MCH RBC QN AUTO: 30.9 PG (ref 26.6–33)
MCHC RBC AUTO-ENTMCNC: 33.3 G/DL (ref 31.5–35.7)
MCV RBC AUTO: 93 FL (ref 79–97)
MONOCYTES # BLD AUTO: 0.6 X10E3/UL (ref 0.1–0.9)
MONOCYTES NFR BLD AUTO: 9 %
NEUTROPHILS # BLD AUTO: 4.1 X10E3/UL (ref 1.4–7)
NEUTROPHILS NFR BLD AUTO: 60 %
PLATELET # BLD AUTO: 298 X10E3/UL (ref 150–379)
POTASSIUM SERPL-SCNC: 5.2 MMOL/L (ref 3.5–5.2)
PROT SERPL-MCNC: 6.6 G/DL (ref 6–8.5)
PSA SERPL-MCNC: 0.3 NG/ML (ref 0–4)
RBC # BLD AUTO: 4.75 X10E6/UL (ref 4.14–5.8)
SODIUM SERPL-SCNC: 142 MMOL/L (ref 134–144)
TRIGL SERPL-MCNC: 41 MG/DL (ref 0–149)
VLDLC SERPL CALC-MCNC: 8 MG/DL (ref 5–40)
WBC # BLD AUTO: 6.7 X10E3/UL (ref 3.4–10.8)

## 2018-06-29 NOTE — PROGRESS NOTES
Great news!   CBC no anemia  Lipid panel is excellent  PSA WNL   Positive Hep C please send results to specialist

## 2018-07-02 NOTE — PROGRESS NOTES
SW Mr. Grove Diss, he was notified of his lab results. He stated that he is still dealing with the DA regarding his Hep C and would like to hold off right now from sending the results to the specialist.  Message routed to Fransisco Douglass NP and Emily Crews / rebeca.

## 2018-08-30 ENCOUNTER — OFFICE VISIT (OUTPATIENT)
Dept: FAMILY MEDICINE CLINIC | Age: 68
End: 2018-08-30

## 2018-08-30 ENCOUNTER — TELEPHONE (OUTPATIENT)
Dept: FAMILY MEDICINE CLINIC | Age: 68
End: 2018-08-30

## 2018-08-30 VITALS
WEIGHT: 166.2 LBS | HEART RATE: 64 BPM | BODY MASS INDEX: 25.19 KG/M2 | HEIGHT: 68 IN | SYSTOLIC BLOOD PRESSURE: 130 MMHG | RESPIRATION RATE: 16 BRPM | DIASTOLIC BLOOD PRESSURE: 68 MMHG | OXYGEN SATURATION: 97 % | TEMPERATURE: 97.8 F

## 2018-08-30 DIAGNOSIS — J20.9 ACUTE BRONCHITIS, UNSPECIFIED ORGANISM: Primary | ICD-10-CM

## 2018-08-30 RX ORDER — AZITHROMYCIN 250 MG/1
TABLET, FILM COATED ORAL
Qty: 6 TAB | Refills: 0 | Status: SHIPPED | OUTPATIENT
Start: 2018-08-30 | End: 2018-09-04

## 2018-08-30 NOTE — PATIENT INSTRUCTIONS
Bronchitis: Care Instructions Your Care Instructions Bronchitis is inflammation of the bronchial tubes, which carry air to the lungs. The tubes swell and produce mucus, or phlegm. The mucus and inflamed bronchial tubes make you cough. You may have trouble breathing. Most cases of bronchitis are caused by viruses like those that cause colds. Antibiotics usually do not help and they may be harmful. Bronchitis usually develops rapidly and lasts about 2 to 3 weeks in otherwise healthy people. Follow-up care is a key part of your treatment and safety. Be sure to make and go to all appointments, and call your doctor if you are having problems. It's also a good idea to know your test results and keep a list of the medicines you take. How can you care for yourself at home? · Take all medicines exactly as prescribed. Call your doctor if you think you are having a problem with your medicine. · Get some extra rest. 
· Take an over-the-counter pain medicine, such as acetaminophen (Tylenol), ibuprofen (Advil, Motrin), or naproxen (Aleve) to reduce fever and relieve body aches. Read and follow all instructions on the label. · Do not take two or more pain medicines at the same time unless the doctor told you to. Many pain medicines have acetaminophen, which is Tylenol. Too much acetaminophen (Tylenol) can be harmful. · Take an over-the-counter cough medicine that contains dextromethorphan to help quiet a dry, hacking cough so that you can sleep. Avoid cough medicines that have more than one active ingredient. Read and follow all instructions on the label. · Breathe moist air from a humidifier, hot shower, or sink filled with hot water. The heat and moisture will thin mucus so you can cough it out. · Do not smoke. Smoking can make bronchitis worse. If you need help quitting, talk to your doctor about stop-smoking programs and medicines. These can increase your chances of quitting for good. When should you call for help? Call 911 anytime you think you may need emergency care. For example, call if: 
  · You have severe trouble breathing.  
 Call your doctor now or seek immediate medical care if: 
  · You have new or worse trouble breathing.  
  · You cough up dark brown or bloody mucus (sputum).  
  · You have a new or higher fever.  
  · You have a new rash.  
 Watch closely for changes in your health, and be sure to contact your doctor if: 
  · You cough more deeply or more often, especially if you notice more mucus or a change in the color of your mucus.  
  · You are not getting better as expected. Where can you learn more? Go to http://tacho-roma.info/. Enter H333 in the search box to learn more about \"Bronchitis: Care Instructions. \" Current as of: December 6, 2017 Content Version: 11.7 © 3765-7055 [employer], [employer]. Care instructions adapted under license by [employer] (which disclaims liability or warranty for this information). If you have questions about a medical condition or this instruction, always ask your healthcare professional. [address] any warranty or liability for your use of this information.

## 2018-08-30 NOTE — MR AVS SNAPSHOT
[facility] 
 
 
 [address] [facility] [address] 
[phone] Patient: Belinda Morataya 
MRN: [id number] CGR:[id number]/38/9932 Visit Information Date & Time Provider Department Dept. Phone Encounter #  
 8/30/2018  8:10 AM Melanie Alvarez NP [facility] [address] [facility] [phone] [id number] Follow-up Instructions Return if symptoms worsen or fail to improve. Follow-up and Disposition History Your Appointments 8/30/2018  8:10 AM  
ESTABLISHED PATIENT with Melanie Alvarez NP  
Ethan Thornton ([address]) Appt Note: walk-in/OK'D by Rebecca/ellie [address] [address]  
[address] [address] [address] Upcoming Health Maintenance Date Due FOBT Q 1 YEAR AGE 50-75 9/26/2002 Pneumococcal 65+ High/Highest Risk (2 of 2 - PPSV23) 12/8/2017 Influenza Age 5 to Adult 8/1/2018 GLAUCOMA SCREENING Q2Y 5/19/2019 DTaP/Tdap/Td series (2 - Td) 3/9/2026 Allergies as of 8/30/2018  Review Complete On: 8/30/2018 By: Melanie Alvarez NP Severity Noted Reaction Type Reactions Codeine  09/10/2013    Itching Current Immunizations  Never Reviewed Name Date Tdap 3/9/2016 Not reviewed this visit You Were Diagnosed With   
  
 Codes Comments Acute bronchitis, unspecified organism    -  Primary ICD-10-CM: J20.9 ICD-9-CM: 466.0 Vitals BP Pulse Temp Resp Height(growth percentile) 130/68 (BP 1 Location: Left arm, BP Patient Position: Sitting) 64 97.8 °F (36.6 °C) (Temporal) 16 5' 8\" (1.727 m) Weight(growth percentile) SpO2 BMI Smoking Status 166 lb 3.2 oz (75.4 kg) 97% 25.27 kg/m2 Former Smoker BMI and BSA Data Body Mass Index Body Surface Area  
 25.27 kg/m 2 1.9 m 2 Preferred Pharmacy Pharmacy Name Phone [facility], [place] [phone] Your Updated Medication List  
  
   
This list is accurate as of 8/30/18  8:08 AM.  Always use your most recent med list.  
  
  
  
  
 azithromycin 250 mg tablet Commonly known as:  Dakota Burleson Take 2 tablets today, then take 1 tablet daily Prescriptions Sent to Pharmacy Refills  
 azithromycin (ZITHROMAX) 250 mg tablet 0 Sig: Take 2 tablets today, then take 1 tablet daily Class: Normal  
 Pharmacy: [address], [place]  #: [phone] Follow-up Instructions Return if symptoms worsen or fail to improve. Patient Instructions Bronchitis: Care Instructions Your Care Instructions Bronchitis is inflammation of the bronchial tubes, which carry air to the lungs. The tubes swell and produce mucus, or phlegm. The mucus and inflamed bronchial tubes make you cough. You may have trouble breathing. Most cases of bronchitis are caused by viruses like those that cause colds. Antibiotics usually do not help and they may be harmful. Bronchitis usually develops rapidly and lasts about 2 to 3 weeks in otherwise healthy people. Follow-up care is a key part of your treatment and safety. Be sure to make and go to all appointments, and call your doctor if you are having problems. It's also a good idea to know your test results and keep a list of the medicines you take. How can you care for yourself at home? · Take all medicines exactly as prescribed. Call your doctor if you think you are having a problem with your medicine. · Get some extra rest. 
· Take an over-the-counter pain medicine, such as acetaminophen (Tylenol), ibuprofen (Advil, Motrin), or naproxen (Aleve) to reduce fever and relieve body aches. Read and follow all instructions on the label.  
· Do not take two or more pain medicines at the same time unless the doctor told you to. Many pain medicines have acetaminophen, which is Tylenol. Too much acetaminophen (Tylenol) can be harmful. · Take an over-the-counter cough medicine that contains dextromethorphan to help quiet a dry, hacking cough so that you can sleep. Avoid cough medicines that have more than one active ingredient. Read and follow all instructions on the label. · Breathe moist air from a humidifier, hot shower, or sink filled with hot water. The heat and moisture will thin mucus so you can cough it out. · Do not smoke. Smoking can make bronchitis worse. If you need help quitting, talk to your doctor about stop-smoking programs and medicines. These can increase your chances of quitting for good. When should you call for help? Call 911 anytime you think you may need emergency care. For example, call if: 
  · You have severe trouble breathing.  
 Call your doctor now or seek immediate medical care if: 
  · You have new or worse trouble breathing.  
  · You cough up dark brown or bloody mucus (sputum).  
  · You have a new or higher fever.  
  · You have a new rash.  
 Watch closely for changes in your health, and be sure to contact your doctor if: 
  · You cough more deeply or more often, especially if you notice more mucus or a change in the color of your mucus.  
  · You are not getting better as expected. Where can you learn more? Go to http://tacho-roma.info/. Enter H333 in the search box to learn more about \"Bronchitis: Care Instructions. \" Current as of: December 6, 2017 Content Version: 11.7 © 0752-2938 [employer]. Care instructions adapted under license by [employer] (which disclaims liability or warranty for this information). If you have questions about a medical condition or this instruction, always ask your healthcare professional. [address] any warranty or liability for your use of this information. Introducing [facility]! Sumayajamil Rao introduces Sport/Life patient portal. Now you can access parts of your medical record, email your doctor's office, and request medication refills online. 1. In your internet browser, go to [email or web address] 2. Click on the First Time User? Click Here link in the Sign In box. You will see the New Member Sign Up page. 3. Enter your Sport/Life Access Code exactly as it appears below. You will not need to use this code after youve completed the sign-up process. If you do not sign up before the expiration date, you must request a new code. · Sport/Life Access Code: [email or web address] Expires: 9/23/2018  8:26 AM 
 
4. Enter the last four digits of your Social Security Number (xxxx) and Date of Birth (mm/dd/yyyy) as indicated and click Submit. You will be taken to the next sign-up page. 5. Create a Sport/Life ID. This will be your Sport/Life login ID and cannot be changed, so think of one that is secure and easy to remember. 6. Create a Sport/Life password. You can change your password at any time. 7. Enter your Password Reset Question and Answer. This can be used at a later time if you forget your password. 8. Enter your e-mail address. You will receive e-mail notification when new information is available in [address] 9. Click Sign Up. You can now view and download portions of your medical record. 10. Click the Download Summary menu link to download a portable copy of your medical information. If you have questions, please visit the Frequently Asked Questions section of the Sport/Life website. Remember, Sport/Life is NOT to be used for urgent needs. For medical emergencies, dial 911. Now available from your iPhone and Android! Please provide this summary of care documentation to your next provider. Your primary care clinician is listed as Gildardo Watson. If you have any questions after today's visit, please call [phone].

## 2018-08-30 NOTE — PROGRESS NOTES
Chief Complaint Patient presents with  Cold Symptoms  
  with cough for 3 days HPI:   
 
James Diane is a 74 y.o. male with a 4 day history of coughing, congestion. Cough is dry and hacking, non-productive, worse at night. Nonsmoker. + exposure to sick contacts, recent travel to [place]. Allergies Allergen Reactions  Codeine Itching Current Outpatient Prescriptions Medication Sig  
 azithromycin (ZITHROMAX) 250 mg tablet Take 2 tablets today, then take 1 tablet daily No current facility-administered medications for this visit. Past Medical History:  
Diagnosis Date  Cancer ([facility]  Malignant neoplasm of prostate ([facility] RT  
 
 
 
ROS:  Denies fever, chills, + cough, chest pain, SOB,  nausea, vomiting, or diarrhea. Denies wt loss, wt gain, hemoptysis, hematochezia or melena. Physical Examination: 
 
/68 (BP 1 Location: Left arm, BP Patient Position: Sitting)  Pulse 64  Temp 97.8 °F (36.6 °C) (Temporal)   Resp 16  Ht 5' 8\" (1.727 m)  Wt 166 lb 3.2 oz (75.4 kg)  SpO2 97%  BMI 25.27 kg/m2 General: WDWN Male in no acute distress HEENT:  NC/AT, EOMI, TMs pearly gray, OP: clear. Mild erythema, dentures present. Neck:  Supple, no JVD, mass or bruit. No thyromegaly. Chest:  Respirations even and unlabored, CTA throughout without wheezes, rales, rubs or ronchi 
Cardiac: Dry hacking cough, wheezing throughout. Extremities:  No cyanosis, clubbing or edema. Moves all extremities without difficulty. Neurologic:  Ambulatory without assistance, CN 2-12 grossly intact. Skin: intact and warm to the touch, no rash Lymphadenopathy: no cervical or supraclavicular nodes Psych: Pleasant and appropriate. Judgment normal.  
 
 
ASSESSMENT AND PLAN:  
 
  ICD-10-CM ICD-9-CM 1. Acute bronchitis, unspecified organism J20.9 466.0 azithromycin (ZITHROMAX) 250 mg tablet Patient aware of plan of care and verbalized understanding.  Questions answered. RTC PRN. Tamanna Hull NP

## 2018-08-30 NOTE — TELEPHONE ENCOUNTER
Coughing and some wheezing x 3 days. Mr. Devaughn Fletcher has been taking some OTC cough drops. I Moe Cavanaugh, she stated that she will be able to work him in this morning. Irving Godoy / reece has been informed.

## 2018-08-30 NOTE — PROGRESS NOTES
1. Have you been to the ER, urgent care clinic since your last visit? Hospitalized since your last visit? No 
 
2. Have you seen or consulted any other health care providers outside of the [address] [place] [address] since your last visit? Include any pap smears or colon screening.  No

## 2020-11-13 ENCOUNTER — OFFICE VISIT (OUTPATIENT)
Dept: PRIMARY CARE CLINIC | Age: 70
End: 2020-11-13

## 2020-11-13 DIAGNOSIS — Z20.822 ENCOUNTER FOR LABORATORY TESTING FOR COVID-19 VIRUS: Primary | ICD-10-CM

## 2020-11-17 LAB — SARS-COV-2, NAA: NOT DETECTED

## 2021-10-14 ENCOUNTER — CLINICAL SUPPORT (OUTPATIENT)
Dept: FAMILY MEDICINE CLINIC | Age: 71
End: 2021-10-14

## 2021-10-14 DIAGNOSIS — Z20.822 EXPOSURE TO COVID-19 VIRUS: Primary | ICD-10-CM

## 2021-10-14 NOTE — PROGRESS NOTES
Patient presented to the office for an outside Covid 19 testing. A copy of his Covid vaccines were copied along with his health insurance cards.

## 2021-10-16 LAB
SARS-COV-2, NAA 2 DAY TAT: NORMAL
SARS-COV-2, NAA: NOT DETECTED

## 2021-10-18 NOTE — PROGRESS NOTES
Patient verified stating name and date of birth. Krysta Magallanes informed of lab results and states understanding.

## 2021-10-18 NOTE — PROGRESS NOTES
Patient verified stating name and date of birth. Steffanie Homans informed of lab results and states understanding.

## 2021-12-03 NOTE — PROGRESS NOTES
Patient verified stating name and date of birth. Patient informed of lab results and states understanding. No

## 2022-07-21 ENCOUNTER — VIRTUAL VISIT (OUTPATIENT)
Dept: FAMILY MEDICINE CLINIC | Age: 72
End: 2022-07-21
Payer: COMMERCIAL

## 2022-07-21 DIAGNOSIS — B02.9 HERPES ZOSTER WITHOUT COMPLICATION: Primary | ICD-10-CM

## 2022-07-21 DIAGNOSIS — C61 PROSTATE CANCER (HCC): ICD-10-CM

## 2022-07-21 PROCEDURE — 99443 PR PHYS/QHP TELEPHONE EVALUATION 21-30 MIN: CPT | Performed by: NURSE PRACTITIONER

## 2022-07-21 RX ORDER — VALACYCLOVIR HYDROCHLORIDE 1 G/1
1000 TABLET, FILM COATED ORAL 2 TIMES DAILY
Qty: 20 TABLET | Refills: 0 | Status: SHIPPED | OUTPATIENT
Start: 2022-07-21 | End: 2022-07-31

## 2022-07-21 NOTE — ACP (ADVANCE CARE PLANNING)
Discussed importance of advanced medical directives with patient. Patient is capable of making decisions.   Vani Puga NP-C

## 2022-07-21 NOTE — PROGRESS NOTES
Ken House is a 73 y.o. male, evaluated via audio-only technology on 7/21/2022 for Rash (On back thinks it shingles)  Vesicular zoster rash /patches started within the last 3 days. Located on left side of back . He endorses size of a tennis area is blistering and starting to scab. Area on left side of trunk size of silver dollar and has scabbed over. Affected areas itch and burn. We discussed subsequent care and patient education sent through my chart. Assessment & Plan:   Diagnoses and all orders for this visit:    1. Herpes zoster without complication  -     valACYclovir (VALTREX) 1 gram tablet; Take 1 Tablet by mouth two (2) times a day for 10 days. Indications: shingles    2. Prostate cancer ([facility]    HX of prostate cancer he endorses he is in remission. The complexity of medical decision making for this visit is moderate       12  Subjective:       Prior to Admission medications    Medication Sig Start Date End Date Taking? Authorizing Provider   valACYclovir (VALTREX) 1 gram tablet Take 1 Tablet by mouth two (2) times a day for 10 days. Indications: shingles 7/21/22 7/31/22 Yes Ceci Hatch NP     Patient Active Problem List   Diagnosis Code    [facility] C61    Hepatitis C B19.20     Current Outpatient Medications   Medication Sig Dispense Refill    valACYclovir (VALTREX) 1 gram tablet Take 1 Tablet by mouth two (2) times a day for 10 days.  Indications: shingles 20 Tablet 0     Allergies   Allergen Reactions    Codeine Itching     Past Medical History:   Diagnosis Date    Cancer ([facility] [address]     Hepatitis C antibody positive in blood     Negative Hep C RNA    Malignant neoplasm of prostate ([facility] [facility]     RT     Past Surgical History:   Procedure Laterality Date    HX COLONOSCOPY  2010    HX HERNIA REPAIR  2016    rt inquinal    HX UROLOGICAL      prostate bx     Family History   Problem Relation Age of Onset    Alzheimer's Disease Mother     Cancer Father     Heart Disease Father     Hypertension Father      Social History     Tobacco Use    Smoking status: Former     Types: Cigarettes     Quit date: 2016     Years since quittin.4    Smokeless tobacco: Never   Substance Use Topics    Alcohol use: Yes     Comment: daily       ROS  Pertinent items are noted in the HPI  No data recorded     Cely Soriano was evaluated through a patient-initiated, synchronous (real-time) audio only encounter. He (or guardian if applicable) is aware that it is a billable service, which includes applicable co-pays, with coverage as determined by his insurance carrier. This visit was conducted with the patient's (and/or Mariam York guardian's) verbal consent. He has not had a related appointment within my department in the past 7 days or scheduled within the next 24 hours. The patient was located in a state where the provider was licensed to provide care. The patient was located at: Home: [place] [phone]  The provider was located at:  [facility] [address] [address]    Total Time: minutes: 21-30 minutes    Blue [place]ung NP

## 2022-07-21 NOTE — PROGRESS NOTES
Chief Complaint   Patient presents with    Rash     On back thinks it shingles     1. \"Have you been to the ER, urgent care clinic since your last visit? Hospitalized since your last visit? \"no    2. \"Have you seen or consulted any other health care providers outside of the [address] [place] [address] since your last visit? \"  no    3. For patients aged 39-70: Has the patient had a colonoscopy / FIT/ Cologuard? No, Care Gap present      If the patient is female:    4. For patients aged 41-77: Has the patient had a mammogram within the past 2 years? NA      5. For patients aged 21-65: Has the patient had a pap smear? NA  Abuse Screening Questionnaire 7/21/2022   Do you ever feel afraid of your partner? N   Are you in a relationship with someone who physically or mentally threatens you? N   Is it safe for you to go home? Y     Fall Risk Assessment, last 12 mths 7/21/2022   Able to walk? Yes   Fall in past 12 months? 0   Do you feel unsteady?  0   Are you worried about falling 0

## 2022-11-07 ENCOUNTER — OFFICE VISIT (OUTPATIENT)
Dept: FAMILY MEDICINE CLINIC | Age: 72
End: 2022-11-07
Payer: COMMERCIAL

## 2022-11-07 DIAGNOSIS — Z23 ENCOUNTER FOR IMMUNIZATION: ICD-10-CM

## 2022-11-07 DIAGNOSIS — R35.1 NOCTURIA: ICD-10-CM

## 2022-11-07 DIAGNOSIS — Z00.00 MEDICARE ANNUAL WELLNESS VISIT, SUBSEQUENT: Primary | ICD-10-CM

## 2022-11-07 DIAGNOSIS — C61 PROSTATE CANCER (HCC): ICD-10-CM

## 2022-11-07 PROCEDURE — G0439 PPPS, SUBSEQ VISIT: HCPCS | Performed by: NURSE PRACTITIONER

## 2022-11-07 PROCEDURE — 36415 COLL VENOUS BLD VENIPUNCTURE: CPT | Performed by: NURSE PRACTITIONER

## 2022-11-07 PROCEDURE — 90694 VACC AIIV4 NO PRSRV 0.5ML IM: CPT | Performed by: NURSE PRACTITIONER

## 2022-11-07 PROCEDURE — 90471 IMMUNIZATION ADMIN: CPT | Performed by: NURSE PRACTITIONER

## 2022-11-07 PROCEDURE — 1123F ACP DISCUSS/DSCN MKR DOCD: CPT | Performed by: NURSE PRACTITIONER

## 2022-11-07 PROCEDURE — 90472 IMMUNIZATION ADMIN EACH ADD: CPT | Performed by: NURSE PRACTITIONER

## 2022-11-07 PROCEDURE — 90677 PCV20 VACCINE IM: CPT | Performed by: NURSE PRACTITIONER

## 2022-11-07 NOTE — PATIENT INSTRUCTIONS
Medicare Wellness Visit, Male    The best way to live healthy is to have a lifestyle where you eat a well-balanced diet, exercise regularly, limit alcohol use, and quit all forms of tobacco/nicotine, if applicable. Regular preventive services are another way to keep healthy. Preventive services (vaccines, screening tests, monitoring & exams) can help personalize your care plan, which helps you manage your own care. Screening tests can find health problems at the earliest stages, when they are easiest to treat. [facility] follows the current, evidence-based guidelines published by the [facility] ([facility]) when recommending preventive services for our patients. Because we follow these guidelines, sometimes recommendations change over time as research supports it. (For example, a prostate screening blood test is no longer routinely recommended for men with no symptoms). Of course, you and your doctor may decide to screen more often for some diseases, based on your risk and co-morbidities (chronic disease you are already diagnosed with). Preventive services for you include:  - Medicare offers their members a free annual wellness visit, which is time for you and your primary care provider to discuss and plan for your preventive service needs. Take advantage of this benefit every year!  -All adults over age 72 should receive the recommended pneumonia vaccines. Current USPSTF guidelines recommend a series of two vaccines for the best pneumonia protection.   -All adults should have a flu vaccine yearly and tetanus vaccine every 10 years.  -All adults age 48 and older should receive the shingles vaccines (series of two vaccines).        -All adults age 38-68 who are overweight should have a diabetes screening test once every three years.   -Other screening tests & preventive services for persons with diabetes include: an eye exam to screen for diabetic retinopathy, a kidney function test, a foot exam, and stricter control over your cholesterol.   -Cardiovascular screening for adults with routine risk involves an electrocardiogram (ECG) at intervals determined by the provider.   -Colorectal cancer screening should be done for adults age 54-65 with no increased risk factors for colorectal cancer. There are a number of acceptable methods of screening for this type of cancer. Each test has its own benefits and drawbacks. Discuss with your provider what is most appropriate for you during your annual wellness visit. The different tests include: colonoscopy (considered the best screening method), a fecal occult blood test, a fecal DNA test, and sigmoidoscopy.  -All adults born between [facility] should be screened once for Hepatitis C.  -An Abdominal Aortic Aneurysm (AAA) Screening is recommended for men age 73-68 who has ever smoked in their lifetime.      Here is a list of your current Health Maintenance items (your personalized list of preventive services) with a due date:  Health Maintenance Due   Topic Date Due    Colorectal Screening  Never done    Shingles Vaccine (1 of 2) Never done    Pneumococcal Vaccine (1 - PCV) Never done    COVID-19 Vaccine (4 - Booster for Pfizer series) 12/02/2021    Yearly Flu Vaccine (1) Never done

## 2022-11-07 NOTE — PROGRESS NOTES
This is the Subsequent Medicare Annual Wellness Exam, performed 12 months or more after the Initial AWV or the last Subsequent AWV    I have reviewed the patient's medical history in detail and updated the computerized patient record. Visit Vitals  /76 (BP 1 Location: Right arm, BP Patient Position: Sitting, BP Cuff Size: Adult)   Pulse 77   Temp 97 °F (36.1 °C) (Temporal)   Resp 16   Ht 5' 8\" (1.727 m)   Wt 155 lb (70.3 kg)   SpO2 98%   BMI 23.57 kg/m²        Assessment/Plan   Education and counseling provided:  Are appropriate based on today's review and evaluation    1. Medicare annual wellness visit, subsequent  -     INFLUENZA, FLUAD, (AGE 65 Y+), IM, PF, 0.5 ML  -     ADMIN INFLUENZA VIRUS VAC  -     PNEUMOCOCCAL, PCV20, PREVNAR 20, (AGE 18 YRS+), IM, PF  -     CBC WITH AUTOMATED DIFF; Future  -     LIPID PANEL; Future  -     METABOLIC PANEL, COMPREHENSIVE; Future  -     COLLECTION VENOUS BLOOD,VENIPUNCTURE; Future  -     PSA, DIAGNOSTIC (PROSTATE SPECIFIC AG); Future  2. Encounter for immunization  -     INFLUENZA, FLUAD, (AGE 65 Y+), IM, PF, 0.5 ML  -     ADMIN INFLUENZA VIRUS VAC  -     PNEUMOCOCCAL, PCV20, PREVNAR 20, (AGE 18 YRS+), IM, PF  3. Nocturia  -     COLLECTION VENOUS BLOOD,VENIPUNCTURE; Future  -     PSA, DIAGNOSTIC (PROSTATE SPECIFIC AG); Future  4. Prostate cancer ([facility] [address]  -     COLLECTION VENOUS BLOOD,VENIPUNCTURE; Future  -     PSA, DIAGNOSTIC (PROSTATE SPECIFIC AG); Future     Depression Risk Factor Screening     3 most recent PHQ Screens 11/7/2022   Little interest or pleasure in doing things Not at all   Feeling down, depressed, irritable, or hopeless Not at all   Total Score PHQ 2 0       Alcohol & Drug Abuse Risk Screen    Do you average more than 1 drink per night or more than 7 drinks a week: yes    In the past three months have you have had more than 4 drinks containing alcohol on one occasion: no          Functional Ability and Level of Safety    Hearing: Hearing is good. Activities of Daily Living: The home contains: handrails  Patient does total self care      Ambulation: with no difficulty     Fall Risk:  Fall Risk Assessment, last 12 mths 2022   Able to walk? Yes   Fall in past 12 months? 0   Do you feel unsteady?  0   Are you worried about falling 0      Abuse Screen:  Patient is not abused       Cognitive Screening    Has your family/caregiver stated any concerns about your memory: no     Cognitive Screening: Normal - Clock Drawing Test    Health Maintenance Due     Health Maintenance Due   Topic Date Due    Colorectal Cancer Screening Combo  Never done    Shingrix Vaccine Age 49> (1 of 2) Never done    Pneumococcal 65+ years (1 - PCV) Never done    COVID-19 Vaccine (4 - Booster for Palmer Peter series) 2021    Flu Vaccine (1) Never done       Patient Care Team   Patient Care Team:  Neli Harding NP as PCP - General (Nurse Practitioner)  Neli Harding NP as PCP - [facility] Provider  Margarita Lake MD (Family Medicine)    History     Patient Active Problem List   Diagnosis Code    Prostate cancer ([facility] [address] C61    Hepatitis C B19.20     Past Medical History:   Diagnosis Date    Cancer ([address]     Hepatitis C antibody positive in blood     Negative Hep C RNA    Malignant neoplasm of prostate ([address]     RT      Past Surgical History:   Procedure Laterality Date    HX COLONOSCOPY      HX HERNIA REPAIR  2016    rt inquinal    HX UROLOGICAL      prostate bx       Allergies   Allergen Reactions    Codeine Itching       Family History   Problem Relation Age of Onset    Alzheimer's Disease Mother     Cancer Father     Heart Disease Father     Hypertension Father      Social History     Tobacco Use    Smoking status: Former     Types: Cigarettes     Quit date: 2016     Years since quittin.7    Smokeless tobacco: Never   Substance Use Topics    Alcohol use: Yes     Comment: daily       Simonne Baumgarten NP-C

## 2022-11-07 NOTE — PROGRESS NOTES
Chief Complaint   Patient presents with    Annual Wellness Visit     1. \"Have you been to the ER, urgent care clinic since your last visit? Hospitalized since your last visit? \" No    2. \"Have you seen or consulted any other health care providers outside of the [address] [place] [address] since your last visit? \" No     3. For patients aged 39-70: Has the patient had a colonoscopy / FIT/ Cologuard? NA - based on age      If the patient is female:    4. For patients aged 41-77: Has the patient had a mammogram within the past 2 years? Yes Mammogram was Normal      5. For patients aged 21-65: Has the patient had a pap smear?  NA - based on age or sex

## 2022-11-08 VITALS
DIASTOLIC BLOOD PRESSURE: 76 MMHG | OXYGEN SATURATION: 98 % | HEART RATE: 77 BPM | TEMPERATURE: 97 F | RESPIRATION RATE: 16 BRPM | WEIGHT: 155 LBS | SYSTOLIC BLOOD PRESSURE: 138 MMHG | HEIGHT: 68 IN | BODY MASS INDEX: 23.49 KG/M2

## 2022-11-08 LAB
ALBUMIN SERPL-MCNC: 4.2 G/DL (ref 3.5–5)
ALBUMIN/GLOB SERPL: 1.3 {RATIO} (ref 1.1–2.2)
ALP SERPL-CCNC: 70 U/L (ref 45–117)
ALT SERPL-CCNC: 26 U/L (ref 12–78)
ANION GAP SERPL CALC-SCNC: 2 MMOL/L (ref 5–15)
AST SERPL-CCNC: 12 U/L (ref 15–37)
BASOPHILS # BLD: 0 K/UL (ref 0–0.1)
BASOPHILS NFR BLD: 0 % (ref 0–1)
BILIRUB SERPL-MCNC: 0.8 MG/DL (ref 0.2–1)
BUN SERPL-MCNC: 12 MG/DL (ref 6–20)
BUN/CREAT SERPL: 15 (ref 12–20)
CALCIUM SERPL-MCNC: 9.2 MG/DL (ref 8.5–10.1)
CHLORIDE SERPL-SCNC: 108 MMOL/L (ref 97–108)
CHOLEST SERPL-MCNC: 203 MG/DL
CO2 SERPL-SCNC: 28 MMOL/L (ref 21–32)
CREAT SERPL-MCNC: 0.8 MG/DL (ref 0.7–1.3)
DIFFERENTIAL METHOD BLD: ABNORMAL
EOSINOPHIL # BLD: 0 K/UL (ref 0–0.4)
EOSINOPHIL NFR BLD: 0 % (ref 0–7)
ERYTHROCYTE [DISTWIDTH] IN BLOOD BY AUTOMATED COUNT: 12.5 % (ref 11.5–14.5)
GLOBULIN SER CALC-MCNC: 3.2 G/DL (ref 2–4)
GLUCOSE SERPL-MCNC: 89 MG/DL (ref 65–100)
HCT VFR BLD AUTO: 49.7 % (ref 36.6–50.3)
HDLC SERPL-MCNC: 88 MG/DL
HDLC SERPL: 2.3 {RATIO} (ref 0–5)
HGB BLD-MCNC: 15.6 G/DL (ref 12.1–17)
IMM GRANULOCYTES # BLD AUTO: 0 K/UL (ref 0–0.04)
IMM GRANULOCYTES NFR BLD AUTO: 0 % (ref 0–0.5)
LDLC SERPL CALC-MCNC: 102.2 MG/DL (ref 0–100)
LYMPHOCYTES # BLD: 2.1 K/UL (ref 0.8–3.5)
LYMPHOCYTES NFR BLD: 28 % (ref 12–49)
MCH RBC QN AUTO: 31.6 PG (ref 26–34)
MCHC RBC AUTO-ENTMCNC: 31.4 G/DL (ref 30–36.5)
MCV RBC AUTO: 100.8 FL (ref 80–99)
MONOCYTES # BLD: 0.5 K/UL (ref 0–1)
MONOCYTES NFR BLD: 7 % (ref 5–13)
NEUTS SEG # BLD: 4.8 K/UL (ref 1.8–8)
NEUTS SEG NFR BLD: 65 % (ref 32–75)
NRBC # BLD: 0 K/UL (ref 0–0.01)
NRBC BLD-RTO: 0 PER 100 WBC
PLATELET # BLD AUTO: 301 K/UL (ref 150–400)
PMV BLD AUTO: 10.9 FL (ref 8.9–12.9)
POTASSIUM SERPL-SCNC: 5.3 MMOL/L (ref 3.5–5.1)
PROT SERPL-MCNC: 7.4 G/DL (ref 6.4–8.2)
PSA SERPL-MCNC: 0.4 NG/ML (ref 0.01–4)
RBC # BLD AUTO: 4.93 M/UL (ref 4.1–5.7)
SODIUM SERPL-SCNC: 138 MMOL/L (ref 136–145)
TRIGL SERPL-MCNC: 64 MG/DL (ref ?–150)
VLDLC SERPL CALC-MCNC: 12.8 MG/DL
WBC # BLD AUTO: 7.4 K/UL (ref 4.1–11.1)

## 2022-11-08 NOTE — ACP (ADVANCE CARE PLANNING)
Discussed importance of advanced medical directives with patient. Patient is capable of making decisions.  Cesar Palacio NP-C

## 2023-10-18 ENCOUNTER — OFFICE VISIT (OUTPATIENT)
Age: 73
End: 2023-10-18
Payer: COMMERCIAL

## 2023-10-18 VITALS
BODY MASS INDEX: 24.71 KG/M2 | HEART RATE: 65 BPM | HEIGHT: 68 IN | SYSTOLIC BLOOD PRESSURE: 138 MMHG | TEMPERATURE: 97 F | DIASTOLIC BLOOD PRESSURE: 72 MMHG | OXYGEN SATURATION: 98 % | WEIGHT: 163 LBS | RESPIRATION RATE: 18 BRPM

## 2023-10-18 DIAGNOSIS — Z23 NEEDS FLU SHOT: ICD-10-CM

## 2023-10-18 DIAGNOSIS — E78.5 HYPERLIPIDEMIA, UNSPECIFIED HYPERLIPIDEMIA TYPE: ICD-10-CM

## 2023-10-18 DIAGNOSIS — Z01.818 PREOP EXAMINATION: Primary | ICD-10-CM

## 2023-10-18 PROCEDURE — 1123F ACP DISCUSS/DSCN MKR DOCD: CPT | Performed by: NURSE PRACTITIONER

## 2023-10-18 PROCEDURE — 90471 IMMUNIZATION ADMIN: CPT | Performed by: NURSE PRACTITIONER

## 2023-10-18 PROCEDURE — 99214 OFFICE O/P EST MOD 30 MIN: CPT | Performed by: NURSE PRACTITIONER

## 2023-10-18 PROCEDURE — 90694 VACC AIIV4 NO PRSRV 0.5ML IM: CPT | Performed by: NURSE PRACTITIONER

## 2023-10-18 PROCEDURE — 93000 ELECTROCARDIOGRAM COMPLETE: CPT | Performed by: NURSE PRACTITIONER

## 2023-10-18 SDOH — HEALTH STABILITY: PHYSICAL HEALTH: ON AVERAGE, HOW MANY MINUTES DO YOU ENGAGE IN EXERCISE AT THIS LEVEL?: 150+ MIN

## 2023-10-18 SDOH — HEALTH STABILITY: PHYSICAL HEALTH: ON AVERAGE, HOW MANY DAYS PER WEEK DO YOU ENGAGE IN MODERATE TO STRENUOUS EXERCISE (LIKE A BRISK WALK)?: 7 DAYS

## 2023-10-18 SDOH — ECONOMIC STABILITY: INCOME INSECURITY: IN THE LAST 12 MONTHS, WAS THERE A TIME WHEN YOU WERE NOT ABLE TO PAY THE MORTGAGE OR RENT ON TIME?: NO

## 2023-10-18 SDOH — ECONOMIC STABILITY: HOUSING INSECURITY
IN THE LAST 12 MONTHS, WAS THERE A TIME WHEN YOU DID NOT HAVE A STEADY PLACE TO SLEEP OR SLEPT IN A SHELTER (INCLUDING NOW)?: NO

## 2023-10-18 SDOH — ECONOMIC STABILITY: HOUSING INSECURITY: IN THE LAST 12 MONTHS, HOW MANY PLACES HAVE YOU LIVED?: 1

## 2023-10-18 ASSESSMENT — SOCIAL DETERMINANTS OF HEALTH (SDOH)
WITHIN THE LAST YEAR, HAVE YOU BEEN HUMILIATED OR EMOTIONALLY ABUSED IN OTHER WAYS BY YOUR PARTNER OR EX-PARTNER?: NO
WITHIN THE LAST YEAR, HAVE YOU BEEN KICKED, HIT, SLAPPED, OR OTHERWISE PHYSICALLY HURT BY YOUR PARTNER OR EX-PARTNER?: NO
WITHIN THE LAST YEAR, HAVE YOU BEEN AFRAID OF YOUR PARTNER OR EX-PARTNER?: NO
WITHIN THE LAST YEAR, HAVE TO BEEN RAPED OR FORCED TO HAVE ANY KIND OF SEXUAL ACTIVITY BY YOUR PARTNER OR EX-PARTNER?: NO
HOW OFTEN DO YOU GET TOGETHER WITH FRIENDS OR RELATIVES?: THREE TIMES A WEEK
HOW OFTEN DO YOU ATTENT MEETINGS OF THE CLUB OR ORGANIZATION YOU BELONG TO?: MORE THAN 4 TIMES PER YEAR
IN A TYPICAL WEEK, HOW MANY TIMES DO YOU TALK ON THE PHONE WITH FAMILY, FRIENDS, OR NEIGHBORS?: THREE TIMES A WEEK

## 2023-10-18 ASSESSMENT — LIFESTYLE VARIABLES
HOW OFTEN DO YOU HAVE A DRINK CONTAINING ALCOHOL: 4 OR MORE TIMES A WEEK
HOW MANY STANDARD DRINKS CONTAINING ALCOHOL DO YOU HAVE ON A TYPICAL DAY: 5 OR 6
HOW OFTEN DO YOU HAVE A DRINK CONTAINING ALCOHOL: 4 OR MORE TIMES A WEEK
HOW MANY STANDARD DRINKS CONTAINING ALCOHOL DO YOU HAVE ON A TYPICAL DAY: 5 OR 6

## 2023-10-18 ASSESSMENT — PATIENT HEALTH QUESTIONNAIRE - PHQ9
SUM OF ALL RESPONSES TO PHQ9 QUESTIONS 1 & 2: 0
SUM OF ALL RESPONSES TO PHQ QUESTIONS 1-9: 0
2. FEELING DOWN, DEPRESSED OR HOPELESS: 0
SUM OF ALL RESPONSES TO PHQ QUESTIONS 1-9: 0
1. LITTLE INTEREST OR PLEASURE IN DOING THINGS: 0
SUM OF ALL RESPONSES TO PHQ QUESTIONS 1-9: 0
SUM OF ALL RESPONSES TO PHQ QUESTIONS 1-9: 0

## 2023-10-19 LAB
ALBUMIN SERPL-MCNC: 3.6 G/DL (ref 3.5–5)
ALBUMIN/GLOB SERPL: 1.4 (ref 1.1–2.2)
ALP SERPL-CCNC: 70 U/L (ref 45–117)
ALT SERPL-CCNC: 26 U/L (ref 12–78)
ANION GAP SERPL CALC-SCNC: 5 MMOL/L (ref 5–15)
AST SERPL-CCNC: 10 U/L (ref 15–37)
BASOPHILS # BLD: 0 K/UL (ref 0–0.1)
BASOPHILS NFR BLD: 0 % (ref 0–1)
BILIRUB SERPL-MCNC: 0.2 MG/DL (ref 0.2–1)
BUN SERPL-MCNC: 13 MG/DL (ref 6–20)
BUN/CREAT SERPL: 16 (ref 12–20)
CALCIUM SERPL-MCNC: 8.4 MG/DL (ref 8.5–10.1)
CHLORIDE SERPL-SCNC: 113 MMOL/L (ref 97–108)
CHOLEST SERPL-MCNC: 175 MG/DL
CO2 SERPL-SCNC: 24 MMOL/L (ref 21–32)
CREAT SERPL-MCNC: 0.82 MG/DL (ref 0.7–1.3)
DIFFERENTIAL METHOD BLD: ABNORMAL
EOSINOPHIL # BLD: 0.1 K/UL (ref 0–0.4)
EOSINOPHIL NFR BLD: 2 % (ref 0–7)
ERYTHROCYTE [DISTWIDTH] IN BLOOD BY AUTOMATED COUNT: 12.7 % (ref 11.5–14.5)
GLOBULIN SER CALC-MCNC: 2.6 G/DL (ref 2–4)
GLUCOSE SERPL-MCNC: 86 MG/DL (ref 65–100)
HCT VFR BLD AUTO: 41.5 % (ref 36.6–50.3)
HDLC SERPL-MCNC: 69 MG/DL
HDLC SERPL: 2.5 (ref 0–5)
HGB BLD-MCNC: 13.2 G/DL (ref 12.1–17)
IMM GRANULOCYTES # BLD AUTO: 0 K/UL (ref 0–0.04)
IMM GRANULOCYTES NFR BLD AUTO: 0 % (ref 0–0.5)
LDLC SERPL CALC-MCNC: 88.2 MG/DL (ref 0–100)
LYMPHOCYTES # BLD: 1.6 K/UL (ref 0.8–3.5)
LYMPHOCYTES NFR BLD: 31 % (ref 12–49)
MCH RBC QN AUTO: 31.7 PG (ref 26–34)
MCHC RBC AUTO-ENTMCNC: 31.8 G/DL (ref 30–36.5)
MCV RBC AUTO: 99.5 FL (ref 80–99)
MONOCYTES # BLD: 0.4 K/UL (ref 0–1)
MONOCYTES NFR BLD: 9 % (ref 5–13)
NEUTS SEG # BLD: 3 K/UL (ref 1.8–8)
NEUTS SEG NFR BLD: 58 % (ref 32–75)
NRBC # BLD: 0 K/UL (ref 0–0.01)
NRBC BLD-RTO: 0 PER 100 WBC
PLATELET # BLD AUTO: 267 K/UL (ref 150–400)
PMV BLD AUTO: 10.6 FL (ref 8.9–12.9)
POTASSIUM SERPL-SCNC: 4.7 MMOL/L (ref 3.5–5.1)
PROT SERPL-MCNC: 6.2 G/DL (ref 6.4–8.2)
RBC # BLD AUTO: 4.17 M/UL (ref 4.1–5.7)
SODIUM SERPL-SCNC: 142 MMOL/L (ref 136–145)
TRIGL SERPL-MCNC: 89 MG/DL
VLDLC SERPL CALC-MCNC: 17.8 MG/DL
WBC # BLD AUTO: 5.2 K/UL (ref 4.1–11.1)

## 2023-10-24 ENCOUNTER — ANESTHESIA EVENT (OUTPATIENT)
Facility: HOSPITAL | Age: 73
End: 2023-10-24
Payer: COMMERCIAL

## 2023-10-24 NOTE — ANESTHESIA PRE PROCEDURE
WBC 5.2 10/18/2023 11:38 AM    RBC 4.17 10/18/2023 11:38 AM    HGB 13.2 10/18/2023 11:38 AM    HCT 41.5 10/18/2023 11:38 AM    MCV 99.5 10/18/2023 11:38 AM    RDW 12.7 10/18/2023 11:38 AM     10/18/2023 11:38 AM       CMP:   Lab Results   Component Value Date/Time     10/18/2023 11:38 AM    K 4.7 10/18/2023 11:38 AM     10/18/2023 11:38 AM    CO2 24 10/18/2023 11:38 AM    BUN 13 10/18/2023 11:38 AM    CREATININE 0.82 10/18/2023 11:38 AM    GFRAA 100 06/25/2020 07:50 AM    AGRATIO 1.3 11/07/2022 08:00 AM    LABGLOM >60 10/18/2023 11:38 AM    GLUCOSE 86 10/18/2023 11:38 AM    PROT 6.2 10/18/2023 11:38 AM    CALCIUM 8.4 10/18/2023 11:38 AM    BILITOT 0.2 10/18/2023 11:38 AM    ALKPHOS 70 10/18/2023 11:38 AM    ALKPHOS 70 11/07/2022 08:00 AM    AST 10 10/18/2023 11:38 AM    ALT 26 10/18/2023 11:38 AM       POC Tests: No results for input(s): \"POCGLU\", \"POCNA\", \"POCK\", \"POCCL\", \"POCBUN\", \"POCHEMO\", \"POCHCT\" in the last 72 hours. Coags: No results found for: \"PROTIME\", \"INR\", \"APTT\"    HCG (If Applicable): No results found for: \"PREGTESTUR\", \"PREGSERUM\", \"HCG\", \"HCGQUANT\"     ABGs: No results found for: \"PHART\", \"PO2ART\", \"JZP9RRR\", \"VNZ2AQD\", \"BEART\", \"J2MHPRKW\"     Type & Screen (If Applicable):  No results found for: \"LABABO\", \"LABRH\"    Drug/Infectious Status (If Applicable):  Lab Results   Component Value Date/Time    HEPCAB 6.0 06/25/2018 08:18 AM       COVID-19 Screening (If Applicable):   Lab Results   Component Value Date/Time    COVID19 Not Detected 10/14/2021 10:15 AM    COVID19 Performed 10/14/2021 10:15 AM           Anesthesia Evaluation    Airway:           Dental:          Pulmonary:Negative Pulmonary ROS                              Cardiovascular:Negative CV ROS                      Neuro/Psych:   Negative Neuro/Psych ROS              GI/Hepatic/Renal:   (+) hepatitis: C, liver disease:,          ROS comment: H/o ETOH abuse  H/o prostate cancer.    Endo/Other:

## 2023-11-05 PROBLEM — H25.11 AGE-RELATED NUCLEAR CATARACT, RIGHT EYE: Chronic | Status: ACTIVE | Noted: 2023-11-05

## 2023-11-06 ENCOUNTER — HOSPITAL ENCOUNTER (OUTPATIENT)
Facility: HOSPITAL | Age: 73
Setting detail: OUTPATIENT SURGERY
Discharge: HOME | End: 2023-11-06
Attending: OPHTHALMOLOGY | Admitting: OPHTHALMOLOGY
Payer: COMMERCIAL

## 2023-11-06 ENCOUNTER — ANESTHESIA (OUTPATIENT)
Facility: HOSPITAL | Age: 73
End: 2023-11-06
Payer: COMMERCIAL

## 2023-11-06 VITALS
WEIGHT: 155 LBS | RESPIRATION RATE: 16 BRPM | HEART RATE: 76 BPM | BODY MASS INDEX: 23.49 KG/M2 | HEIGHT: 68 IN | SYSTOLIC BLOOD PRESSURE: 132 MMHG | DIASTOLIC BLOOD PRESSURE: 73 MMHG | TEMPERATURE: 97 F | OXYGEN SATURATION: 99 %

## 2023-11-06 PROBLEM — H25.11 AGE-RELATED NUCLEAR CATARACT, RIGHT EYE: Chronic | Status: RESOLVED | Noted: 2023-11-05 | Resolved: 2023-11-06

## 2023-11-06 PROCEDURE — V2632 POST CHMBR INTRAOCULAR LENS: HCPCS | Performed by: OPHTHALMOLOGY

## 2023-11-06 PROCEDURE — 3700000001 HC ADD 15 MINUTES (ANESTHESIA): Performed by: OPHTHALMOLOGY

## 2023-11-06 PROCEDURE — 3600000003 HC SURGERY LEVEL 3 BASE: Performed by: OPHTHALMOLOGY

## 2023-11-06 PROCEDURE — 2720000010 HC SURG SUPPLY STERILE: Performed by: OPHTHALMOLOGY

## 2023-11-06 PROCEDURE — 3600000013 HC SURGERY LEVEL 3 ADDTL 15MIN: Performed by: OPHTHALMOLOGY

## 2023-11-06 PROCEDURE — 6360000002 HC RX W HCPCS: Performed by: ANESTHESIOLOGY

## 2023-11-06 PROCEDURE — 2500000003 HC RX 250 WO HCPCS: Performed by: OPHTHALMOLOGY

## 2023-11-06 PROCEDURE — 6370000000 HC RX 637 (ALT 250 FOR IP): Performed by: OPHTHALMOLOGY

## 2023-11-06 PROCEDURE — 2709999900 HC NON-CHARGEABLE SUPPLY: Performed by: OPHTHALMOLOGY

## 2023-11-06 PROCEDURE — 2580000003 HC RX 258: Performed by: OPHTHALMOLOGY

## 2023-11-06 PROCEDURE — 7100000010 HC PHASE II RECOVERY - FIRST 15 MIN: Performed by: OPHTHALMOLOGY

## 2023-11-06 PROCEDURE — 3700000000 HC ANESTHESIA ATTENDED CARE: Performed by: OPHTHALMOLOGY

## 2023-11-06 PROCEDURE — 2500000003 HC RX 250 WO HCPCS: Performed by: ANESTHESIOLOGY

## 2023-11-06 PROCEDURE — 6360000002 HC RX W HCPCS: Performed by: OPHTHALMOLOGY

## 2023-11-06 DEVICE — LENS CLAREON IOL 13.0D: Type: IMPLANTABLE DEVICE | Site: EYE | Status: FUNCTIONAL

## 2023-11-06 RX ORDER — BALANCED SALT SOLUTION ENRICHED WITH BICARBONATE, DEXTROSE, AND GLUTATHIONE
KIT INTRAOCULAR AS NEEDED
Status: DISCONTINUED | OUTPATIENT
Start: 2023-11-06 | End: 2023-11-06 | Stop reason: HOSPADM

## 2023-11-06 RX ORDER — TROPICAMIDE 10 MG/ML
1 SOLUTION/ DROPS OPHTHALMIC EVERY 5 MIN PRN
Status: DISCONTINUED | OUTPATIENT
Start: 2023-11-06 | End: 2023-11-06 | Stop reason: HOSPADM

## 2023-11-06 RX ORDER — BALANCED SALT SOLUTION ENRICHED WITH BICARBONATE, DEXTROSE, AND GLUTATHIONE
KIT INTRAOCULAR PRN
Status: DISCONTINUED | OUTPATIENT
Start: 2023-11-06 | End: 2023-11-06 | Stop reason: ALTCHOICE

## 2023-11-06 RX ORDER — KETOROLAC TROMETHAMINE 5 MG/ML
1 SOLUTION OPHTHALMIC EVERY 5 MIN PRN
Status: DISCONTINUED | OUTPATIENT
Start: 2023-11-06 | End: 2023-11-06 | Stop reason: HOSPADM

## 2023-11-06 RX ORDER — SODIUM CHLORIDE 0.9 % (FLUSH) 0.9 %
5-40 SYRINGE (ML) INJECTION PRN
Status: DISCONTINUED | OUTPATIENT
Start: 2023-11-06 | End: 2023-11-06 | Stop reason: HOSPADM

## 2023-11-06 RX ORDER — PROPOFOL 10 MG/ML
INJECTION, EMULSION INTRAVENOUS CONTINUOUS PRN
Status: DISCONTINUED | OUTPATIENT
Start: 2023-11-06 | End: 2023-11-06 | Stop reason: SDUPTHER

## 2023-11-06 RX ORDER — SODIUM CHLORIDE 0.9 % (FLUSH) 0.9 %
5-40 SYRINGE (ML) INJECTION EVERY 12 HOURS SCHEDULED
Status: DISCONTINUED | OUTPATIENT
Start: 2023-11-06 | End: 2023-11-06 | Stop reason: HOSPADM

## 2023-11-06 RX ORDER — EPINEPHRINE 1 MG/ML
1 INJECTION, SOLUTION, CONCENTRATE INTRAVENOUS ONCE
Status: DISCONTINUED | OUTPATIENT
Start: 2023-11-06 | End: 2023-11-06 | Stop reason: HOSPADM

## 2023-11-06 RX ORDER — TETRACAINE HYDROCHLORIDE 5 MG/ML
2 SOLUTION OPHTHALMIC AS NEEDED
Status: DISCONTINUED | OUTPATIENT
Start: 2023-11-06 | End: 2023-11-06 | Stop reason: HOSPADM

## 2023-11-06 RX ORDER — TETRACAINE HYDROCHLORIDE 5 MG/ML
2 SOLUTION OPHTHALMIC AS NEEDED
Status: COMPLETED | OUTPATIENT
Start: 2023-11-06 | End: 2023-11-06

## 2023-11-06 RX ORDER — TOBRAMYCIN AND DEXAMETHASONE 3; 1 MG/ML; MG/ML
1 SUSPENSION/ DROPS OPHTHALMIC ONCE
Status: COMPLETED | OUTPATIENT
Start: 2023-11-06 | End: 2023-11-06

## 2023-11-06 RX ORDER — SODIUM CHLORIDE 9 MG/ML
INJECTION, SOLUTION INTRAVENOUS PRN
Status: DISCONTINUED | OUTPATIENT
Start: 2023-11-06 | End: 2023-11-06 | Stop reason: HOSPADM

## 2023-11-06 RX ORDER — LIDOCAINE HYDROCHLORIDE 20 MG/ML
INJECTION, SOLUTION EPIDURAL; INFILTRATION; INTRACAUDAL; PERINEURAL PRN
Status: DISCONTINUED | OUTPATIENT
Start: 2023-11-06 | End: 2023-11-06 | Stop reason: SDUPTHER

## 2023-11-06 RX ORDER — LIDOCAINE HYDROCHLORIDE 20 MG/ML
5 INJECTION, SOLUTION EPIDURAL; INFILTRATION; INTRACAUDAL; PERINEURAL ONCE
Status: COMPLETED | OUTPATIENT
Start: 2023-11-06 | End: 2023-11-06

## 2023-11-06 RX ORDER — PHENYLEPHRINE HYDROCHLORIDE 100 MG/ML
1 SOLUTION/ DROPS OPHTHALMIC EVERY 5 MIN PRN
Status: DISCONTINUED | OUTPATIENT
Start: 2023-11-06 | End: 2023-11-06 | Stop reason: HOSPADM

## 2023-11-06 RX ORDER — SODIUM CHLORIDE, SODIUM LACTATE, POTASSIUM CHLORIDE, CALCIUM CHLORIDE 600; 310; 30; 20 MG/100ML; MG/100ML; MG/100ML; MG/100ML
INJECTION, SOLUTION INTRAVENOUS CONTINUOUS
Status: DISPENSED | OUTPATIENT
Start: 2023-11-06 | End: 2023-11-06

## 2023-11-06 RX ADMIN — TETRACAINE HYDROCHLORIDE 2 DROP: 5 SOLUTION OPHTHALMIC at 13:35

## 2023-11-06 RX ADMIN — TROPICAMIDE 1 DROP: 10 SOLUTION/ DROPS OPHTHALMIC at 13:25

## 2023-11-06 RX ADMIN — PROPOFOL 100 MCG/KG/MIN: 10 INJECTION, EMULSION INTRAVENOUS at 14:25

## 2023-11-06 RX ADMIN — LIDOCAINE HYDROCHLORIDE: 35 GEL OPHTHALMIC at 13:25

## 2023-11-06 RX ADMIN — PHENYLEPHRINE HYDROCHLORIDE 1 DROP: 100 SOLUTION/ DROPS OPHTHALMIC at 13:25

## 2023-11-06 RX ADMIN — TETRACAINE HYDROCHLORIDE 2 DROP: 5 SOLUTION OPHTHALMIC at 13:44

## 2023-11-06 RX ADMIN — KETOROLAC TROMETHAMINE 1 DROP: 0.5 SOLUTION OPHTHALMIC at 13:25

## 2023-11-06 RX ADMIN — SODIUM CHLORIDE, POTASSIUM CHLORIDE, SODIUM LACTATE AND CALCIUM CHLORIDE: 600; 310; 30; 20 INJECTION, SOLUTION INTRAVENOUS at 13:35

## 2023-11-06 RX ADMIN — LIDOCAINE HYDROCHLORIDE 100 MG: 20 INJECTION, SOLUTION EPIDURAL; INFILTRATION; INTRACAUDAL; PERINEURAL at 14:26

## 2023-11-06 RX ADMIN — PHENYLEPHRINE HYDROCHLORIDE 1 DROP: 100 SOLUTION/ DROPS OPHTHALMIC at 13:35

## 2023-11-06 RX ADMIN — TROPICAMIDE 1 DROP: 10 SOLUTION/ DROPS OPHTHALMIC at 13:35

## 2023-11-06 RX ADMIN — TETRACAINE HYDROCHLORIDE 2 DROP: 5 SOLUTION OPHTHALMIC at 13:25

## 2023-11-06 ASSESSMENT — PAIN - FUNCTIONAL ASSESSMENT: PAIN_FUNCTIONAL_ASSESSMENT: NONE - DENIES PAIN

## 2023-11-06 NOTE — ANESTHESIA POSTPROCEDURE EVALUATION
Department of Anesthesiology  Postprocedure Note    Patient: Virgilio Rojo  MRN: [id number]  YOB: 1950  Date of evaluation: 11/6/2023      Procedure Summary     Date: 11/06/23 Room / Location: [facility] MAIN OR 1 / [facility] MAIN OR    Anesthesia Start: 1416 Anesthesia Stop:     Procedure: RIGHT EYE EXTRACAPSULAR CATARACT REMOVAL WITH INSERTION OF INTRAOCULAR IMPLANT (MAC W/TOPICAL) (Right: Eye) Diagnosis:       Age-related nuclear cataract, right eye      (Age-related nuclear cataract, right eye [H25.11])    Surgeons: Tye Jo MD Responsible Provider: Paola Ruth MD    Anesthesia Type: MAC ASA Status: 3          Anesthesia Type: No value filed.     Maurisio Phase I:      Maurisio Phase II:        Anesthesia Post Evaluation    Patient location during evaluation: bedside  Patient participation: complete - patient participated  Level of consciousness: awake and alert  Pain score: 0  Airway patency: patent  Nausea & Vomiting: no nausea  Complications: no  Cardiovascular status: hemodynamically stable  Respiratory status: acceptable  Hydration status: stable  Pain management: adequate

## 2023-11-06 NOTE — OP NOTE
25.9 seconds, at an average power of 3.4% was required to remove the nucleus in a phaco chop technique in burst mode at high vacuum using OZil technology followed by irrigation/aspiration of the remaining cortex and vacuum polishing of the posterior capsule. The capsular bag was then filled with Provisc and an Lio Clareon posterior chamber foldable intraocular lens, model SY60WF, power +13.0 diopters, was injected into the capsular bag using an Lio cartridge and the lens centered. Irrigation/aspiration was used to remove the Provisc from the anterior chamber and capsular bag. The anterior chamber was filled with balanced salt solution and the incisions tested and found to be watertight without a suture. A collagen shield soaked in Tobradex ophthalmic drops and tetracaine drops was placed on the cornea followed by Pred-G ophthalmic ointment. The speculum and drape were then removed and an eye shield taped over the eye. The patient tolerated the procedure well and left the operating room for the step-down unit under the care of Anesthesia, in a satisfactory postop condition, alert and awake. The patient is to be discharged home when released by Anesthesia, to remain at bed rest with head elevated for the next 24 hours, resume all customary preop diet and medications and take Tylenol as needed for discomfort.   A followup appointment is scheduled in my office in [facility] on 11/07/2023 at 11.15 a.m      Sanjay Key MD      [id number]/  D:  11/06/2023 15:04  T:  11/06/2023 18:16  JOB #:  [id number]

## 2023-11-06 NOTE — BRIEF OP NOTE
Brief Postoperative Note      Patient: Shaneka Lisa  YOB: 1950  MRN: [id number]    Date of Procedure: 11/6/2023    Pre-Op Diagnosis Codes:     * Age-related nuclear cataract, right eye [H25.11]    Post-Op Diagnosis:  PSEUDOPHAKIA RIGHT EYE       Procedure(s):  RIGHT EYE EXTRACAPSULAR CATARACT REMOVAL WITH INSERTION OF INTRAOCULAR IMPLANT (MAC W/TOPICAL)    Surgeon(s):  Yudith Perkins MD    Assistant:  * No surgical staff found *    Anesthesia: Monitor Anesthesia Care    Estimated Blood Loss (mL): NONE    Complications: None    Specimens:   * No specimens in log *    Implants:  Implant Name Type Inv.  Item Serial No.  Lot No. LRB No. Used Action   [id number] - [id number] Intraocular Lens  [id number] [employer]CR  Right 1 Implanted         Drains: * No LDAs found *    Findings: CATARACT      Electronically signed by Emy Oden MD on 11/6/2023 at 2:58 PM

## 2023-11-06 NOTE — INTERVAL H&P NOTE
Update History & Physical    The patient's History and Physical of October 18, 2023 was reviewed with the patient and I examined the patient. There was no change. The surgical site was confirmed by the patient and me. Plan: The risks, benefits, expected outcome, and alternative to the recommended procedure have been discussed with the patient. Patient understands and wants to proceed with the procedure.      Electronically signed by Shirley Gilliam MD on 11/6/2023 at 2:06 PM

## 2023-11-08 ENCOUNTER — ANESTHESIA EVENT (OUTPATIENT)
Facility: HOSPITAL | Age: 73
End: 2023-11-08
Payer: COMMERCIAL

## 2023-11-10 RX ORDER — PREDNISOLONE ACETATE 10 MG/ML
SUSPENSION/ DROPS OPHTHALMIC
COMMUNITY
Start: 2023-10-19

## 2023-11-12 PROBLEM — H25.12 AGE-RELATED NUCLEAR CATARACT, LEFT EYE: Chronic | Status: ACTIVE | Noted: 2023-11-12

## 2023-11-13 ENCOUNTER — ANESTHESIA (OUTPATIENT)
Facility: HOSPITAL | Age: 73
End: 2023-11-13
Payer: COMMERCIAL

## 2023-11-13 ENCOUNTER — HOSPITAL ENCOUNTER (OUTPATIENT)
Facility: HOSPITAL | Age: 73
Setting detail: OUTPATIENT SURGERY
Discharge: HOME | End: 2023-11-13
Attending: OPHTHALMOLOGY | Admitting: OPHTHALMOLOGY
Payer: COMMERCIAL

## 2023-11-13 VITALS
RESPIRATION RATE: 16 BRPM | SYSTOLIC BLOOD PRESSURE: 113 MMHG | BODY MASS INDEX: 23.49 KG/M2 | DIASTOLIC BLOOD PRESSURE: 66 MMHG | OXYGEN SATURATION: 98 % | WEIGHT: 155 LBS | HEIGHT: 68 IN | HEART RATE: 68 BPM | TEMPERATURE: 98.6 F

## 2023-11-13 PROCEDURE — 6360000002 HC RX W HCPCS: Performed by: ANESTHESIOLOGY

## 2023-11-13 PROCEDURE — 7100000011 HC PHASE II RECOVERY - ADDTL 15 MIN: Performed by: OPHTHALMOLOGY

## 2023-11-13 PROCEDURE — 3700000000 HC ANESTHESIA ATTENDED CARE: Performed by: OPHTHALMOLOGY

## 2023-11-13 PROCEDURE — V2632 POST CHMBR INTRAOCULAR LENS: HCPCS | Performed by: OPHTHALMOLOGY

## 2023-11-13 PROCEDURE — 6360000002 HC RX W HCPCS: Performed by: OPHTHALMOLOGY

## 2023-11-13 PROCEDURE — 2500000003 HC RX 250 WO HCPCS: Performed by: OPHTHALMOLOGY

## 2023-11-13 PROCEDURE — 7100000010 HC PHASE II RECOVERY - FIRST 15 MIN: Performed by: OPHTHALMOLOGY

## 2023-11-13 PROCEDURE — 3700000001 HC ADD 15 MINUTES (ANESTHESIA): Performed by: OPHTHALMOLOGY

## 2023-11-13 PROCEDURE — 2709999900 HC NON-CHARGEABLE SUPPLY: Performed by: OPHTHALMOLOGY

## 2023-11-13 PROCEDURE — 2720000010 HC SURG SUPPLY STERILE: Performed by: OPHTHALMOLOGY

## 2023-11-13 PROCEDURE — 6370000000 HC RX 637 (ALT 250 FOR IP): Performed by: OPHTHALMOLOGY

## 2023-11-13 PROCEDURE — 3600000013 HC SURGERY LEVEL 3 ADDTL 15MIN: Performed by: OPHTHALMOLOGY

## 2023-11-13 PROCEDURE — 2580000003 HC RX 258: Performed by: OPHTHALMOLOGY

## 2023-11-13 PROCEDURE — 3600000003 HC SURGERY LEVEL 3 BASE: Performed by: OPHTHALMOLOGY

## 2023-11-13 DEVICE — LENS CLAREON IOL 14.0D: Type: IMPLANTABLE DEVICE | Site: EYE | Status: FUNCTIONAL

## 2023-11-13 RX ORDER — TOBRAMYCIN AND DEXAMETHASONE 3; 1 MG/ML; MG/ML
1 SUSPENSION/ DROPS OPHTHALMIC ONCE
Status: COMPLETED | OUTPATIENT
Start: 2023-11-13 | End: 2023-11-13

## 2023-11-13 RX ORDER — TROPICAMIDE 10 MG/ML
1 SOLUTION/ DROPS OPHTHALMIC EVERY 5 MIN PRN
Status: COMPLETED | OUTPATIENT
Start: 2023-11-13 | End: 2023-11-13

## 2023-11-13 RX ORDER — PHENYLEPHRINE HYDROCHLORIDE 100 MG/ML
1 SOLUTION/ DROPS OPHTHALMIC EVERY 5 MIN PRN
Status: COMPLETED | OUTPATIENT
Start: 2023-11-13 | End: 2023-11-13

## 2023-11-13 RX ORDER — KETOROLAC TROMETHAMINE 5 MG/ML
1 SOLUTION OPHTHALMIC EVERY 5 MIN PRN
Status: COMPLETED | OUTPATIENT
Start: 2023-11-13 | End: 2023-11-13

## 2023-11-13 RX ORDER — LIDOCAINE HYDROCHLORIDE 20 MG/ML
5 INJECTION, SOLUTION EPIDURAL; INFILTRATION; INTRACAUDAL; PERINEURAL ONCE
Status: COMPLETED | OUTPATIENT
Start: 2023-11-13 | End: 2023-11-13

## 2023-11-13 RX ORDER — TETRACAINE HYDROCHLORIDE 5 MG/ML
2 SOLUTION OPHTHALMIC AS NEEDED
Status: COMPLETED | OUTPATIENT
Start: 2023-11-13 | End: 2023-11-13

## 2023-11-13 RX ORDER — SODIUM CHLORIDE, SODIUM LACTATE, POTASSIUM CHLORIDE, CALCIUM CHLORIDE 600; 310; 30; 20 MG/100ML; MG/100ML; MG/100ML; MG/100ML
INJECTION, SOLUTION INTRAVENOUS CONTINUOUS
Status: DISPENSED | OUTPATIENT
Start: 2023-11-13 | End: 2023-11-13

## 2023-11-13 RX ORDER — PROPOFOL 10 MG/ML
INJECTION, EMULSION INTRAVENOUS PRN
Status: DISCONTINUED | OUTPATIENT
Start: 2023-11-13 | End: 2023-11-13 | Stop reason: SDUPTHER

## 2023-11-13 RX ORDER — BALANCED SALT SOLUTION ENRICHED WITH BICARBONATE, DEXTROSE, AND GLUTATHIONE
KIT INTRAOCULAR AS NEEDED
Status: DISCONTINUED | OUTPATIENT
Start: 2023-11-13 | End: 2023-11-13 | Stop reason: HOSPADM

## 2023-11-13 RX ORDER — TETRACAINE HYDROCHLORIDE 5 MG/ML
2 SOLUTION OPHTHALMIC AS NEEDED
Status: DISCONTINUED | OUTPATIENT
Start: 2023-11-13 | End: 2023-11-13 | Stop reason: HOSPADM

## 2023-11-13 RX ORDER — EPINEPHRINE 1 MG/ML
1 INJECTION, SOLUTION, CONCENTRATE INTRAVENOUS ONCE
Status: DISCONTINUED | OUTPATIENT
Start: 2023-11-13 | End: 2023-11-13 | Stop reason: HOSPADM

## 2023-11-13 RX ADMIN — TROPICAMIDE 1 DROP: 10 SOLUTION/ DROPS OPHTHALMIC at 11:26

## 2023-11-13 RX ADMIN — SODIUM CHLORIDE, POTASSIUM CHLORIDE, SODIUM LACTATE AND CALCIUM CHLORIDE: 600; 310; 30; 20 INJECTION, SOLUTION INTRAVENOUS at 11:30

## 2023-11-13 RX ADMIN — TETRACAINE HYDROCHLORIDE 2 DROP: 5 SOLUTION OPHTHALMIC at 11:18

## 2023-11-13 RX ADMIN — LIDOCAINE HYDROCHLORIDE: 35 GEL OPHTHALMIC at 11:18

## 2023-11-13 RX ADMIN — KETOROLAC TROMETHAMINE 1 DROP: 5 SOLUTION/ DROPS OPHTHALMIC at 11:38

## 2023-11-13 RX ADMIN — TROPICAMIDE 1 DROP: 10 SOLUTION/ DROPS OPHTHALMIC at 11:18

## 2023-11-13 RX ADMIN — TROPICAMIDE 1 DROP: 10 SOLUTION/ DROPS OPHTHALMIC at 11:39

## 2023-11-13 RX ADMIN — PHENYLEPHRINE HYDROCHLORIDE 1 DROP: 100 SOLUTION/ DROPS OPHTHALMIC at 11:18

## 2023-11-13 RX ADMIN — PROPOFOL 100 MG: 10 INJECTION, EMULSION INTRAVENOUS at 12:41

## 2023-11-13 RX ADMIN — KETOROLAC TROMETHAMINE 1 DROP: 5 SOLUTION/ DROPS OPHTHALMIC at 11:26

## 2023-11-13 RX ADMIN — KETOROLAC TROMETHAMINE 1 DROP: 5 SOLUTION/ DROPS OPHTHALMIC at 11:18

## 2023-11-13 RX ADMIN — TETRACAINE HYDROCHLORIDE 2 DROP: 5 SOLUTION OPHTHALMIC at 11:38

## 2023-11-13 RX ADMIN — TETRACAINE HYDROCHLORIDE 2 DROP: 5 SOLUTION OPHTHALMIC at 11:26

## 2023-11-13 RX ADMIN — PROPOFOL 100 MCG/KG/MIN: 10 INJECTION, EMULSION INTRAVENOUS at 12:42

## 2023-11-13 RX ADMIN — PHENYLEPHRINE HYDROCHLORIDE 1 DROP: 100 SOLUTION/ DROPS OPHTHALMIC at 11:26

## 2023-11-13 RX ADMIN — PHENYLEPHRINE HYDROCHLORIDE 1 DROP: 100 SOLUTION/ DROPS OPHTHALMIC at 11:38

## 2023-11-13 ASSESSMENT — PAIN - FUNCTIONAL ASSESSMENT: PAIN_FUNCTIONAL_ASSESSMENT: 0-10

## 2023-11-13 ASSESSMENT — PAIN SCALES - GENERAL: PAINLEVEL_OUTOF10: 0

## 2023-11-13 NOTE — INTERVAL H&P NOTE
Update History & Physical    The patient's History and Physical of October 18, 2023 was reviewed with the patient and I examined the patient. There was no change. The surgical site was confirmed by the patient and me. Plan: The risks, benefits, expected outcome, and alternative to the recommended procedure have been discussed with the patient. Patient understands and wants to proceed with the procedure.      Electronically signed by Diana Jennings MD on 11/13/2023 at 12:34 PM

## 2023-11-13 NOTE — OP NOTE
hydrodelineation of the nucleus in the capsular bag with balanced salt solution achieving rotation. A CDE of 3.83, phacoemulsification time of 30.7 seconds, at an average power of 3.5% was required to remove the nucleus in a phaco chop technique in burst mode at high vacuum using OZil technology followed by irrigation/aspiration of the remaining cortex and vacuum polishing of the posterior capsule. The capsular bag was then filled with Provisc and an Lio AcrySof posterior chamber foldable acrylic intraocular lens, model SY60WF, power +14.0 diopters, was injected into the capsular bag using an Lio cartridge and the lens centered. Irrigation/aspiration was used to remove the Provisc from the anterior chamber and capsular bag. The anterior chamber was filled with balanced salt solution and the incisions tested and found to be watertight without a suture. A collagen shield soaked in Tobradex ophthalmic drops and tetracaine drops was placed on the cornea followed by Pred-G ophthalmic ointment. The speculum and drape were then removed and an eye shield taped over the eye. The patient tolerated the procedure well and left the operating room for the step-down unit under the care of Anesthesia, in a satisfactory postop condition, alert and awake. The patient is to be discharged home when released by Anesthesia, to remain at bed rest with head elevated for the next 24 hours, resume all customary preop diet and medications and take Tylenol as needed for discomfort. A followup appointment is scheduled in  [address] office on 11/04/2023 at 11.40 a.m.       Sarita Amanda MD      [id number]/  D:  11/13/2023 13:20  T:  11/13/2023 17:28  JOB #:  [id number]

## 2023-11-13 NOTE — BRIEF OP NOTE
Brief Postoperative Note      Patient: Betty Baig  YOB: 1950  MRN: [id number]    Date of Procedure: 11/13/2023    Pre-Op Diagnosis Codes:     * Age-related nuclear cataract, left eye [H25.12]    Post-Op Diagnosis:  PSEUDOPHAKIA LEFT EYE       Procedure(s):  LEFT EYE EXTRACAPSULAR CATARACT REMOVAL WITH INSERTION OF INTRAOCULAR IMPLANT (MAC W/TOPICAL)    Surgeon(s):  Srinivas Amor MD    Assistant:  * No surgical staff found *    Anesthesia: Monitor Anesthesia Care    Estimated Blood Loss (mL): NONE    Complications: None    Specimens:   * No specimens in log *    Implants:  Implant Name Type Inv.  Item Serial No.  Lot No. LRB No. Used Action   [id number] - [id number] Intraocular Lens  [id number]   Left 1 Implanted         Drains: * No LDAs found *    Findings: CATARACT      Electronically signed by Arvind Vegas MD on 11/13/2023 at 1:07 PM

## 2023-11-13 NOTE — ANESTHESIA POSTPROCEDURE EVALUATION
Department of Anesthesiology  Postprocedure Note    Patient: Namrata Payton  MRN: [id number]  YOB: 1950  Date of evaluation: 11/13/2023      Procedure Summary     Date: 11/13/23 [facility]    Anesthesia Start: 8294 Anesthesia Stop: 6885    Procedure: LEFT EYE EXTRACAPSULAR CATARACT REMOVAL WITH INSERTION OF INTRAOCULAR IMPLANT (MAC W/TOPICAL) (Left: Eye) Diagnosis:       Age-related nuclear cataract, left eye      (Age-related nuclear cataract, left eye [H25.12])    Surgeons: Mega Montana MD Responsible Provider: Marylu Siemens, MD    Anesthesia Type: MAC ASA Status: 2          Anesthesia Type: No value filed.     Maurisio Phase I:      Maurisio Phase II:        Anesthesia Post Evaluation    Patient location during evaluation: PACU  Patient participation: complete - patient participated  Level of consciousness: sleepy but conscious  Pain score: 0  Nausea & Vomiting: no nausea and no vomiting  Complications: no  Cardiovascular status: blood pressure returned to baseline  Respiratory status: acceptable and room air  Hydration status: euvolemic  Pain management: adequate

## 2024-10-29 ENCOUNTER — OFFICE VISIT (OUTPATIENT)
Age: 74
End: 2024-10-29
Payer: COMMERCIAL

## 2024-10-29 VITALS
SYSTOLIC BLOOD PRESSURE: 130 MMHG | DIASTOLIC BLOOD PRESSURE: 60 MMHG | TEMPERATURE: 97 F | BODY MASS INDEX: 24.55 KG/M2 | WEIGHT: 162 LBS | HEIGHT: 68 IN | RESPIRATION RATE: 20 BRPM | OXYGEN SATURATION: 99 % | HEART RATE: 90 BPM

## 2024-10-29 DIAGNOSIS — C61 MALIGNANT NEOPLASM OF PROSTATE (HCC): ICD-10-CM

## 2024-10-29 DIAGNOSIS — Z00.00 ENCOUNTER FOR WELL ADULT EXAM WITHOUT ABNORMAL FINDINGS: Primary | ICD-10-CM

## 2024-10-29 DIAGNOSIS — E78.5 HYPERLIPIDEMIA, UNSPECIFIED HYPERLIPIDEMIA TYPE: ICD-10-CM

## 2024-10-29 DIAGNOSIS — Z23 NEEDS FLU SHOT: ICD-10-CM

## 2024-10-29 PROCEDURE — 90653 IIV ADJUVANT VACCINE IM: CPT | Performed by: NURSE PRACTITIONER

## 2024-10-29 PROCEDURE — 99397 PER PM REEVAL EST PAT 65+ YR: CPT | Performed by: NURSE PRACTITIONER

## 2024-10-29 PROCEDURE — 90471 IMMUNIZATION ADMIN: CPT | Performed by: NURSE PRACTITIONER

## 2024-10-29 PROCEDURE — 36415 COLL VENOUS BLD VENIPUNCTURE: CPT | Performed by: NURSE PRACTITIONER

## 2024-10-29 SDOH — ECONOMIC STABILITY: FOOD INSECURITY: WITHIN THE PAST 12 MONTHS, THE FOOD YOU BOUGHT JUST DIDN'T LAST AND YOU DIDN'T HAVE MONEY TO GET MORE.: NEVER TRUE

## 2024-10-29 SDOH — ECONOMIC STABILITY: FOOD INSECURITY: WITHIN THE PAST 12 MONTHS, YOU WORRIED THAT YOUR FOOD WOULD RUN OUT BEFORE YOU GOT MONEY TO BUY MORE.: NEVER TRUE

## 2024-10-29 SDOH — ECONOMIC STABILITY: INCOME INSECURITY: HOW HARD IS IT FOR YOU TO PAY FOR THE VERY BASICS LIKE FOOD, HOUSING, MEDICAL CARE, AND HEATING?: NOT HARD AT ALL

## 2024-10-29 ASSESSMENT — PATIENT HEALTH QUESTIONNAIRE - PHQ9
SUM OF ALL RESPONSES TO PHQ QUESTIONS 1-9: 0
1. LITTLE INTEREST OR PLEASURE IN DOING THINGS: NOT AT ALL
SUM OF ALL RESPONSES TO PHQ QUESTIONS 1-9: 0
2. FEELING DOWN, DEPRESSED OR HOPELESS: NOT AT ALL
SUM OF ALL RESPONSES TO PHQ9 QUESTIONS 1 & 2: 0

## 2024-10-29 NOTE — PROGRESS NOTES
Patient was administered influenza vaccine via IM injection.  Patient tolerated medication without noted side effects.  Medication information reviewed with patient, patient states understanding. Patient to resume routine medications at home.  Patient given copy of AVS with medication information and instructions for home.  AVS reviewed with patient and patient states understanding.     Patient here for labs  drawn from right antecubital without pain or bruising.

## 2024-10-29 NOTE — PROGRESS NOTES
\"Have you been to the ER, urgent care clinic since your last visit?  Hospitalized since your last visit?\"    NO    “Have you seen or consulted any other health care providers outside of [facility] since your last visit?”    NO        “Have you had a colorectal cancer screening such as a colonoscopy/FIT/Cologuard?    no    No colonoscopy on file  No cologuard on file  No FIT/FOBT on file   No flexible sigmoidoscopy on file         Click Here for Release of Records Request      Chief Complaint   Patient presents with    Annual Exam         Vitals:    10/29/24 0740   BP: 130/60   Pulse: 90   Resp: 20   Temp: 97 °F (36.1 °C)   SpO2: 99%

## 2024-10-29 NOTE — PATIENT INSTRUCTIONS
Well Visit, Over 65: Care Instructions  Well visits can help you stay healthy. Your doctor has checked your overall health and may have suggested ways to take good care of yourself. Your doctor also may have recommended tests. You can help prevent illness with healthy eating, good sleep, vaccinations, regular exercise, and other steps.    Get the tests that you and your doctor decide on. Depending on your age and risks, examples might include hearing tests as well as screening for colon, breast, and lung cancer. Screening helps find diseases before any symptoms appear.   Eat healthy foods. Choose fruits, vegetables, whole grains, lean protein, and low-fat dairy foods. Limit saturated fat, and reduce salt.     Limit alcohol. Men should have no more than 2 drinks a day. Women should have no more than 1. For some people, no alcohol is the best choice.   Exercise. It can help prevent falls. Get at least 30 minutes of exercise on most days of the week. Walking, yoga, and jeancarlos chi can be good choices.     Reach and stay at your healthy weight. This will lower your risk for many health problems.   Take care of your mental health. Try to stay connected with friends, family, and community, and find ways to manage stress.     If you're feeling depressed or hopeless, talk to someone. A counselor can help. If you don't have a counselor, talk to your doctor.   Talk to your doctor if you think you may have a problem with alcohol or drug use. This includes prescription medicines and illegal drugs.     Avoid tobacco and nicotine: Don't smoke, vape, or chew. If you need help quitting, talk to your doctor.   Practice safer sex. Getting tested, using condoms or dental dams, and limiting sex partners can help prevent STIs.     Make an advance directive. This is a legal way to tell your family and doctor what you want to happen at the end of your life or when you can't speak for yourself.   Prevent problems where you can. Protect

## 2024-10-29 NOTE — PROGRESS NOTES
Well Adult Note  Name: Chris Stahl Today’s Date: 10/29/2024   MRN: [id number] Sex: Male   Age: 74 y.o. Ethnicity: Declined   : 1950 Race: Declined      Chris Stahl is here for a well adult exam.       Subjective   History:  Mr. Stahl presents for a well adult visit.   Mr. Stahl endorses he is doing well no interval problems or concerns. He has a hx of prostate cancer and requests to check a PSA Level. He declines all care gaps with the exception of influenza immunization.       Review of Systems  Pertinent items are noted on the HPI.   Allergies   Allergen Reactions    Codeine Itching     Prior to Visit Medications    Medication Sig Taking? Authorizing Provider   prednisoLONE acetate (PRED FORTE) 1 % ophthalmic suspension   Provider, MD Zaida     Past Medical History:   Diagnosis Date    Cancer (HCC)     Hepatitis C antibody positive in blood     Negative Hep C RNA    Malignant neoplasm of prostate (HCC)     RT     Past Surgical History:   Procedure Laterality Date    COLONOSCOPY      EYE SURGERY Right 2023    RIGHT EYE EXTRACAPSULAR CATARACT REMOVAL WITH INSERTION OF INTRAOCULAR IMPLANT (MAC W/TOPICAL) performed by Weiler, Harold H, MD at [facility] MAIN OR    EYE SURGERY Left 2023    LEFT EYE EXTRACAPSULAR CATARACT REMOVAL WITH INSERTION OF INTRAOCULAR IMPLANT (MAC W/TOPICAL) performed by Weiler, Harold H, MD at [facility] MAIN OR    HERNIA REPAIR      rt inquinal    UROLOGICAL SURGERY      prostate bx     Family History   Problem Relation Age of Onset    Heart Disease Father     Cancer Father     Alzheimer's Disease Mother     Hypertension Father      Social History     Tobacco Use    Smoking status: Former     Current packs/day: 0.00     Types: Cigarettes     Start date: 1966     Quit date: 2016     Years since quittin.7     Passive exposure: Past    Smokeless tobacco: Never   Vaping Use    Vaping status: Never Used   Substance Use Topics    Alcohol use: Yes    Drug use: No

## 2024-10-30 LAB
ALBUMIN SERPL-MCNC: 3.8 G/DL (ref 3.5–5)
ALBUMIN/GLOB SERPL: 1.3 (ref 1.1–2.2)
ALP SERPL-CCNC: 84 U/L (ref 45–117)
ALT SERPL-CCNC: 23 U/L (ref 12–78)
ANION GAP SERPL CALC-SCNC: 2 MMOL/L (ref 2–12)
AST SERPL-CCNC: 14 U/L (ref 15–37)
BASOPHILS # BLD: 0 K/UL (ref 0–0.1)
BASOPHILS NFR BLD: 0 % (ref 0–1)
BILIRUB SERPL-MCNC: 0.3 MG/DL (ref 0.2–1)
BUN SERPL-MCNC: 13 MG/DL (ref 6–20)
BUN/CREAT SERPL: 14 (ref 12–20)
CALCIUM SERPL-MCNC: 8.7 MG/DL (ref 8.5–10.1)
CHLORIDE SERPL-SCNC: 112 MMOL/L (ref 97–108)
CHOLEST SERPL-MCNC: 183 MG/DL
CO2 SERPL-SCNC: 27 MMOL/L (ref 21–32)
CREAT SERPL-MCNC: 0.95 MG/DL (ref 0.7–1.3)
DIFFERENTIAL METHOD BLD: ABNORMAL
EOSINOPHIL # BLD: 0.1 K/UL (ref 0–0.4)
EOSINOPHIL NFR BLD: 2 % (ref 0–7)
ERYTHROCYTE [DISTWIDTH] IN BLOOD BY AUTOMATED COUNT: 12.9 % (ref 11.5–14.5)
GLOBULIN SER CALC-MCNC: 2.9 G/DL (ref 2–4)
GLUCOSE SERPL-MCNC: 98 MG/DL (ref 65–100)
HCT VFR BLD AUTO: 44.5 % (ref 36.6–50.3)
HDLC SERPL-MCNC: 75 MG/DL
HDLC SERPL: 2.4 (ref 0–5)
HGB BLD-MCNC: 13.7 G/DL (ref 12.1–17)
IMM GRANULOCYTES # BLD AUTO: 0 K/UL (ref 0–0.04)
IMM GRANULOCYTES NFR BLD AUTO: 0 % (ref 0–0.5)
LDLC SERPL CALC-MCNC: 86.2 MG/DL (ref 0–100)
LYMPHOCYTES # BLD: 3.7 K/UL (ref 0.8–3.5)
LYMPHOCYTES NFR BLD: 46 % (ref 12–49)
MCH RBC QN AUTO: 31 PG (ref 26–34)
MCHC RBC AUTO-ENTMCNC: 30.8 G/DL (ref 30–36.5)
MCV RBC AUTO: 100.7 FL (ref 80–99)
MONOCYTES # BLD: 0.5 K/UL (ref 0–1)
MONOCYTES NFR BLD: 7 % (ref 5–13)
NEUTS SEG # BLD: 3.7 K/UL (ref 1.8–8)
NEUTS SEG NFR BLD: 45 % (ref 32–75)
NRBC # BLD: 0 K/UL (ref 0–0.01)
NRBC BLD-RTO: 0 PER 100 WBC
PLATELET # BLD AUTO: 294 K/UL (ref 150–400)
PMV BLD AUTO: 10.4 FL (ref 8.9–12.9)
POTASSIUM SERPL-SCNC: 4.9 MMOL/L (ref 3.5–5.1)
PROT SERPL-MCNC: 6.7 G/DL (ref 6.4–8.2)
PSA SERPL-MCNC: 0.4 NG/ML (ref 0.01–4)
RBC # BLD AUTO: 4.42 M/UL (ref 4.1–5.7)
SODIUM SERPL-SCNC: 141 MMOL/L (ref 136–145)
TRIGL SERPL-MCNC: 109 MG/DL
VLDLC SERPL CALC-MCNC: 21.8 MG/DL
WBC # BLD AUTO: 8.1 K/UL (ref 4.1–11.1)

## 2025-03-17 SDOH — ECONOMIC STABILITY: INCOME INSECURITY: IN THE LAST 12 MONTHS, WAS THERE A TIME WHEN YOU WERE NOT ABLE TO PAY THE MORTGAGE OR RENT ON TIME?: NO

## 2025-03-17 SDOH — ECONOMIC STABILITY: FOOD INSECURITY: WITHIN THE PAST 12 MONTHS, YOU WORRIED THAT YOUR FOOD WOULD RUN OUT BEFORE YOU GOT MONEY TO BUY MORE.: NEVER TRUE

## 2025-03-17 SDOH — ECONOMIC STABILITY: FOOD INSECURITY: WITHIN THE PAST 12 MONTHS, THE FOOD YOU BOUGHT JUST DIDN'T LAST AND YOU DIDN'T HAVE MONEY TO GET MORE.: NEVER TRUE

## 2025-03-17 SDOH — ECONOMIC STABILITY: TRANSPORTATION INSECURITY
IN THE PAST 12 MONTHS, HAS LACK OF TRANSPORTATION KEPT YOU FROM MEETINGS, WORK, OR FROM GETTING THINGS NEEDED FOR DAILY LIVING?: NO

## 2025-03-17 SDOH — ECONOMIC STABILITY: TRANSPORTATION INSECURITY
IN THE PAST 12 MONTHS, HAS THE LACK OF TRANSPORTATION KEPT YOU FROM MEDICAL APPOINTMENTS OR FROM GETTING MEDICATIONS?: NO

## 2025-03-19 ENCOUNTER — OFFICE VISIT (OUTPATIENT)
Age: 75
End: 2025-03-19
Payer: COMMERCIAL

## 2025-03-19 VITALS
RESPIRATION RATE: 19 BRPM | TEMPERATURE: 97.3 F | HEIGHT: 68 IN | HEART RATE: 83 BPM | WEIGHT: 160 LBS | OXYGEN SATURATION: 99 % | BODY MASS INDEX: 24.25 KG/M2 | SYSTOLIC BLOOD PRESSURE: 140 MMHG | DIASTOLIC BLOOD PRESSURE: 80 MMHG

## 2025-03-19 DIAGNOSIS — N52.9 ERECTILE DYSFUNCTION, UNSPECIFIED ERECTILE DYSFUNCTION TYPE: ICD-10-CM

## 2025-03-19 DIAGNOSIS — K21.9 GASTROESOPHAGEAL REFLUX DISEASE WITHOUT ESOPHAGITIS: ICD-10-CM

## 2025-03-19 DIAGNOSIS — C61 PROSTATE CANCER (HCC): Primary | ICD-10-CM

## 2025-03-19 DIAGNOSIS — R35.0 URINARY FREQUENCY: ICD-10-CM

## 2025-03-19 LAB
BILIRUBIN, URINE, POC: ABNORMAL
BLOOD URINE, POC: NEGATIVE
GLUCOSE URINE, POC: NEGATIVE
KETONES, URINE, POC: ABNORMAL
LEUKOCYTE ESTERASE, URINE, POC: NEGATIVE
NITRITE, URINE, POC: NEGATIVE
PH, URINE, POC: 5.5 (ref 4.6–8)
PROTEIN,URINE, POC: NEGATIVE
SPECIFIC GRAVITY, URINE, POC: 1.02 (ref 1–1.03)
URINALYSIS CLARITY, POC: CLEAR
URINALYSIS COLOR, POC: YELLOW
UROBILINOGEN, POC: ABNORMAL

## 2025-03-19 PROCEDURE — 81003 URINALYSIS AUTO W/O SCOPE: CPT | Performed by: NURSE PRACTITIONER

## 2025-03-19 RX ORDER — L.ACIDOPH/L.BULG/B.BIF/S.THERM 1B-250 MG
TABLET ORAL
COMMUNITY

## 2025-03-19 RX ORDER — OMEPRAZOLE 20 MG/1
20 CAPSULE, DELAYED RELEASE ORAL
Qty: 180 CAPSULE | Refills: 1 | Status: SHIPPED | OUTPATIENT
Start: 2025-03-19

## 2025-03-19 ASSESSMENT — PATIENT HEALTH QUESTIONNAIRE - PHQ9
2. FEELING DOWN, DEPRESSED OR HOPELESS: NOT AT ALL
SUM OF ALL RESPONSES TO PHQ QUESTIONS 1-9: 0
1. LITTLE INTEREST OR PLEASURE IN DOING THINGS: NOT AT ALL
SUM OF ALL RESPONSES TO PHQ QUESTIONS 1-9: 0

## 2025-03-19 NOTE — PROGRESS NOTES
Patient here for labs  drawn from right  antecubital without pain or bruising.   
AAA screen  Never done    COVID-19 Vaccine (4 - 2024-25 season) 09/01/2024         Return in about 6 months (around 9/19/2025). (Or sooner if needed)    The patient (or guardian, if applicable) and other individuals in attendance with the patient were advised that Artificial Intelligence will be utilized during this visit to record, process the conversation to generate a clinical note, and support improvement of the AI technology. The patient (or guardian, if applicable) and other individuals in attendance at the appointment consented to the use of AI, including the recording.      ELBA Lucero

## 2025-03-20 LAB
EST. AVERAGE GLUCOSE BLD GHB EST-MCNC: 97 MG/DL
HBA1C MFR BLD: 5 % (ref 4–5.6)

## 2025-03-23 ENCOUNTER — RESULTS FOLLOW-UP (OUTPATIENT)
Age: 75
End: 2025-03-23

## 2025-03-28 ENCOUNTER — CLINICAL SUPPORT (OUTPATIENT)
Age: 75
End: 2025-03-28

## 2025-03-28 DIAGNOSIS — R74.8 ABNORMAL LIVER ENZYMES: Primary | ICD-10-CM

## 2025-03-29 ENCOUNTER — RESULTS FOLLOW-UP (OUTPATIENT)
Age: 75
End: 2025-03-29

## 2025-03-29 LAB
ALBUMIN SERPL-MCNC: 3.7 G/DL (ref 3.5–5)
ALBUMIN/GLOB SERPL: 1.3 (ref 1.1–2.2)
ALP SERPL-CCNC: 96 U/L (ref 45–117)
ALT SERPL-CCNC: 22 U/L (ref 12–78)
AST SERPL-CCNC: 14 U/L (ref 15–37)
BILIRUB DIRECT SERPL-MCNC: <0.1 MG/DL (ref 0–0.2)
BILIRUB SERPL-MCNC: 0.2 MG/DL (ref 0.2–1)
COMMENT:: NORMAL
GLOBULIN SER CALC-MCNC: 2.9 G/DL (ref 2–4)
PROT SERPL-MCNC: 6.6 G/DL (ref 6.4–8.2)
SPECIMEN HOLD: NORMAL

## (undated) DEVICE — SOLUTION IRRIG 250ML 0.9% SOD CHL USP POUR PLAS BTL

## (undated) DEVICE — SOLUTION IRRIG 250ML STRL H2O PLAS POUR BTL USP

## (undated) DEVICE — MARKER,SKIN,WI/RULER AND LABELS: Brand: MEDLINE

## (undated) DEVICE — THE MONARCH® III "C" CARTRIDGE IS A SINGLE-USE POLYPROPYLENE CARTRIDGE FOR POSTERIOR CHAMBER IOL DELIVERY.: Brand: MONARCH®

## (undated) DEVICE — MICROSURGICAL INSTRUMENT IRR. CYSTITOME 25GA FORMED-REVERSE CUTTING: Brand: ALCON

## (undated) DEVICE — 45° KELMAN®, 0.9 MM TURBOSONICS® MINI-FLARED ABS® TIP: Brand: ALCON, KELMAN, TURBOSONICS, MINI-FLARED ABS

## (undated) DEVICE — BETADINE 5% EYE SOL

## (undated) DEVICE — MICROSURGICAL INSTRUMENT ANTERIOR CHAMBER CANNULA 30GA: Brand: ALCON

## (undated) DEVICE — OPHTHALMIC KNIFE 15°: Brand: ALCON

## (undated) DEVICE — PACK PHACO 0.9MM 45DEG BAL FMS IRR ASPIR CENTURION

## (undated) DEVICE — SATINSLIT® KNIFE 3.2MM ANGLED: Brand: SATINSLIT®

## (undated) DEVICE — Device

## (undated) DEVICE — KIT PT CARE CATRCT POSTOP CUST

## (undated) DEVICE — B-H IRRIGATING CAN 19GA FLAT ANGLED 8MM: Brand: OPHTHALMIC CANNULA

## (undated) DEVICE — GLOVE ORANGE PI 8   MSG9080